# Patient Record
Sex: FEMALE | Race: WHITE | NOT HISPANIC OR LATINO | Employment: FULL TIME | URBAN - METROPOLITAN AREA
[De-identification: names, ages, dates, MRNs, and addresses within clinical notes are randomized per-mention and may not be internally consistent; named-entity substitution may affect disease eponyms.]

---

## 2017-02-15 ENCOUNTER — ALLSCRIPTS OFFICE VISIT (OUTPATIENT)
Dept: OTHER | Facility: OTHER | Age: 22
End: 2017-02-15

## 2017-03-20 ENCOUNTER — GENERIC CONVERSION - ENCOUNTER (OUTPATIENT)
Dept: OTHER | Facility: OTHER | Age: 22
End: 2017-03-20

## 2018-01-14 VITALS
SYSTOLIC BLOOD PRESSURE: 140 MMHG | BODY MASS INDEX: 27.11 KG/M2 | HEIGHT: 64 IN | DIASTOLIC BLOOD PRESSURE: 92 MMHG | TEMPERATURE: 99.4 F | HEART RATE: 104 BPM | WEIGHT: 158.8 LBS

## 2018-04-11 ENCOUNTER — OFFICE VISIT (OUTPATIENT)
Dept: FAMILY MEDICINE CLINIC | Facility: HOSPITAL | Age: 23
End: 2018-04-11
Payer: COMMERCIAL

## 2018-04-11 VITALS
HEIGHT: 64 IN | TEMPERATURE: 98.4 F | BODY MASS INDEX: 27.42 KG/M2 | WEIGHT: 160.6 LBS | SYSTOLIC BLOOD PRESSURE: 128 MMHG | RESPIRATION RATE: 12 BRPM | HEART RATE: 76 BPM | DIASTOLIC BLOOD PRESSURE: 82 MMHG

## 2018-04-11 DIAGNOSIS — J35.8 TONSILLITH: Primary | ICD-10-CM

## 2018-04-11 PROCEDURE — 99213 OFFICE O/P EST LOW 20 MIN: CPT | Performed by: FAMILY MEDICINE

## 2018-04-11 RX ORDER — LORATADINE 10 MG/1
TABLET ORAL
COMMUNITY
End: 2018-12-05

## 2018-04-11 RX ORDER — NORETHINDRONE ACETATE AND ETHINYL ESTRADIOL AND FERROUS FUMARATE 1MG-20(21)
1 KIT ORAL DAILY
Refills: 2 | COMMUNITY
Start: 2018-03-14 | End: 2022-03-28

## 2018-04-11 RX ORDER — FLUTICASONE PROPIONATE 50 MCG
SPRAY, SUSPENSION (ML) NASAL
COMMUNITY
End: 2018-12-05

## 2018-04-11 NOTE — PROGRESS NOTES
[facility]  [address] [address]  Tel: [phone]    Patient is here for an acute visit    With hx of tonsiliths  Gets stones in her tonsillar area  Food also gets caught  This may lead to sore throats  Notes bad breath can be associated with this  No fever, no current sore throat  Would like this to be treated  -----------------------------  Review of Systems   Constitutional: Negative  Negative for activity change, appetite change, chills, diaphoresis, fatigue and fever  HENT: Negative for congestion, facial swelling and sore throat  Respiratory: Negative  Negative for apnea, cough, chest tightness and shortness of breath  Cardiovascular: Negative  Negative for chest pain and palpitations  Gastrointestinal: Negative  Negative for abdominal distention, abdominal pain, blood in stool, constipation, diarrhea and nausea  Genitourinary: Negative  Negative for difficulty urinating, dysuria, flank pain and frequency  Social Hx reviewed:    Social History     Social History    Marital status: /Civil Union     Spouse name: N/A    Number of children: N/A    Years of education: N/A     Occupational History    Not on file  Social History Main Topics    Smoking status: Never Smoker    Smokeless tobacco: Never Used    Alcohol use Yes      Comment: social    Drug use: No    Sexual activity: Not on file      Comment: Oral contraceptive use      Other Topics Concern    Not on file     Social History Narrative    No narrative on file       Past Medical History:   Diagnosis Date    GERD (gastroesophageal reflux disease)     [place]   10/12/15     Lactose intolerance     [place]   12/8/16           No Known Allergies      Vitals:    04/11/18 1719   BP: 128/82   Pulse: 76   Resp: 12   Temp: 98 4 °F (36 9 °C)     Physical Exam   Constitutional: She appears well-developed and well-nourished  HENT:   Head: Normocephalic and atraumatic  Right Ear: Tympanic membrane, external ear and ear canal normal    Left Ear: Tympanic membrane, external ear and ear canal normal    Nose: No mucosal edema or rhinorrhea  Right sinus exhibits no maxillary sinus tenderness and no frontal sinus tenderness  Left sinus exhibits no maxillary sinus tenderness and no frontal sinus tenderness  Mouth/Throat: Uvula is midline and oropharynx is clear and moist  Tonsils are 2+ on the right  Tonsils are 2+ on the left  Eyes: Conjunctivae, EOM and lids are normal  Pupils are equal, round, and reactive to light  Right eye exhibits no discharge and no exudate  Left eye exhibits no discharge and no exudate  Right conjunctiva is not injected  Right conjunctiva has no hemorrhage  Left conjunctiva is not injected  Left conjunctiva has no hemorrhage  Neck: Normal range of motion  Neck supple  Cardiovascular: Normal rate, regular rhythm and normal heart sounds  Pulmonary/Chest: Effort normal and breath sounds normal    Abdominal: Soft  Bowel sounds are normal    Skin: Skin is warm and dry  Psychiatric: She has a normal mood and affect  Nursing note and vitals reviewed  Problem List Items Addressed This Visit     None      Visit Diagnoses     Tonsillith    -  Primary    Relevant Orders    Ambulatory Referral to Otolaryngology        Discussed tonsilith/frequent sore throats  Advised referral to ENT for consideration of tonsillectomy  To call our office if any concerns/questions at [phone]

## 2018-05-21 ENCOUNTER — TELEPHONE (OUTPATIENT)
Dept: FAMILY MEDICINE CLINIC | Facility: HOSPITAL | Age: 23
End: 2018-05-21

## 2018-05-21 NOTE — TELEPHONE ENCOUNTER
appt with Massimo carter, per documentation from Urgent Care she is to see a Urologist  Pt has appt with Urologist on 6/5/18, could not get in sooner   Pt was instructed to call our office if she has any active symptoms prior to Urologist appt, she is not currently having any now

## 2018-06-04 NOTE — PROGRESS NOTES
6/5/2018      Chief Complaint   Patient presents with    Urinary Tract Infection    Gross Hematuria       Assessment and Plan    21 y o  female managed by Dr Wilson Cassette    1  Gross hematuria   -will proceed with cystoscopy and ultrasound of the patient's kidney and bladders given her gross hematuria and smoking history, the patient understands and agrees to this treatment plan, will call with any further gross hematuria in the meantime      History of Present Illness  Jean Szymanski is a 21 y o  female here for follow up evaluation of gross hematuria  States that this occurred on May 19th and her menstrual cycle had ended on May 17th  She denies any menstrual abnormalities and/or spotting on a regular basis  Presented to the urgent care and had a straight catheterized urine specimen collected which revealed 3+ red blood cells  Culture revealed no growth  KUB revealed no stones  The patient denies any gross hematuria since this time  Urine dip in office today reveals blood  Does have a smoking history  No family history of  abnormalities and/or cancer  Review of Systems   Constitutional: Negative for activity change, chills and fever  Gastrointestinal: Negative for abdominal distention and abdominal pain  Musculoskeletal: Negative for back pain and gait problem  Psychiatric/Behavioral: Negative for behavioral problems and confusion  Urinary Incontinence Screening      Most Recent Value   Urinary Incontinence   Urinary Incontinence? No   Incomplete emptying? Yes   Urinary frequency? No   Urinary urgency? No   Urinary hesitancy? Yes   Dysuria (painful difficult urination)? No   Nocturia (waking up to use the bathroom)? No   Straining (having to push to go)? No   Weak stream?  No   Intermittent stream?  No   Vaginal pressure? Yes   Vaginal dryness? No          Past Medical History  Past Medical History:   Diagnosis Date    GERD (gastroesophageal reflux disease)     LA   10/12/15  Lactose intolerance     [place]   12/8/16       Past Social History  Past Surgical History:   Procedure Laterality Date    TOOTH EXTRACTION  2012     History   Smoking Status    Never Smoker   Smokeless Tobacco    Never Used       Past Family History  Family History   Problem Relation Age of Onset    Diabetes Father     Breast cancer Paternal Grandmother     Diabetes Paternal Grandfather        Past Social history  Social History     Social History    Marital status: /Civil Union     Spouse name: N/A    Number of children: N/A    Years of education: N/A     Occupational History    Not on file  Social History Main Topics    Smoking status: Never Smoker    Smokeless tobacco: Never Used    Alcohol use Yes      Comment: social    Drug use: No    Sexual activity: Not on file      Comment: Oral contraceptive use      Other Topics Concern    Not on file     Social History Narrative    No narrative on file       Current Medications  Current Outpatient Prescriptions   Medication Sig Dispense Refill    fluticasone (FLONASE ALLERGY RELIEF) 50 mcg/act nasal spray into each nostril      JUNEL FE 1/20 1-20 MG-MCG per tablet Take 1 tablet by mouth daily  2    Lactase 4500 units CHEW Chew      loratadine (CLARITIN) 10 mg tablet Take by mouth       No current facility-administered medications for this visit  Allergies  No Known Allergies      The following portions of the patient's history were reviewed and updated as appropriate: allergies, current medications, past medical history, past social history, past surgical history and problem list       Vitals  Vitals:    06/05/18 1013   BP: 110/80   Pulse: 76   Weight: 71 4 kg (157 lb 8 oz)   Height: 5' 4" (1 626 m)       Physical Exam  Constitutional   General appearance: Patient is seated and in no acute distress, well appearing and well nourished     Head and Face   Head and face: Normal     Eyes   Conjunctiva and lids: No erythema, swelling or discharge  Ears, Nose, Mouth, and Throat   Hearing: Normal     Pulmonary   Respiratory effort: No increased work of breathing or signs of respiratory distress  Cardiovascular   Examination of extremities for edema and/or varicosities: Normal     Abdomen   Abdomen: Non-tender, no masses  Musculoskeletal   Gait and station: Normal     Skin   Skin and subcutaneous tissue: Warm, dry, and intact  No visible lesions or rashes    Psychiatric   Judgment and insight: Normal  Recent and remote memory:  Normal  Mood and affect: Normal      Results  Recent Results (from the past 1 hour(s))   POCT urine dip    Collection Time: 06/05/18 10:24 AM   Result Value Ref Range    LEUKOCYTE ESTERASE,UA trace      NITRITE,UA -     SL AMB POCT UROBILINOGEN -     SL AMB POCT URINE PROTEIN -      PH,UA 6 0      BLOOD,UA moderate      SPECIFIC GRAVITY,UA 1 005      KETONES,UA -      BILIRUBIN,UA -     GLUCOSE, UA -      COLOR,UA yellow      CLARITY,UA clear    ]  No results found for: PSA  Lab Results   Component Value Date    GLUCOSE 96 12/07/2016     Lab Results   Component Value Date    WBC 10 39 (H) 12/07/2016    HGB 15 3 12/07/2016    HCT 43 7 12/07/2016    MCV 85 12/07/2016     12/07/2016       Orders  Orders Placed This Encounter   Procedures    POCT urine dip

## 2018-06-05 ENCOUNTER — OFFICE VISIT (OUTPATIENT)
Dept: UROLOGY | Facility: HOSPITAL | Age: 23
End: 2018-06-05
Payer: COMMERCIAL

## 2018-06-05 VITALS
WEIGHT: 157.5 LBS | SYSTOLIC BLOOD PRESSURE: 110 MMHG | HEART RATE: 76 BPM | DIASTOLIC BLOOD PRESSURE: 80 MMHG | BODY MASS INDEX: 26.89 KG/M2 | HEIGHT: 64 IN

## 2018-06-05 DIAGNOSIS — R31.0 GROSS HEMATURIA: Primary | ICD-10-CM

## 2018-06-05 DIAGNOSIS — Z87.448 HISTORY OF GROSS HEMATURIA: Primary | ICD-10-CM

## 2018-06-05 LAB
BACTERIA UR QL AUTO: NORMAL /HPF
BILIRUB UR QL STRIP: NEGATIVE
CLARITY UR: CLEAR
COLOR UR: YELLOW
GLUCOSE UR STRIP-MCNC: NEGATIVE MG/DL
HGB UR QL STRIP.AUTO: NEGATIVE
HYALINE CASTS #/AREA URNS LPF: NORMAL /LPF
KETONES UR STRIP-MCNC: NEGATIVE MG/DL
LEUKOCYTE ESTERASE UR QL STRIP: NEGATIVE
NITRITE UR QL STRIP: NEGATIVE
NON-SQ EPI CELLS URNS QL MICRO: NORMAL /HPF
PH UR STRIP.AUTO: 6.5 [PH] (ref 4.5–8)
PROT UR STRIP-MCNC: NEGATIVE MG/DL
RBC #/AREA URNS AUTO: NORMAL /HPF
SL AMB  POCT GLUCOSE, UA: NORMAL
SL AMB LEUKOCYTE ESTERASE,UA: NORMAL
SL AMB POCT BILIRUBIN,UA: NORMAL
SL AMB POCT BLOOD,UA: NORMAL
SL AMB POCT CLARITY,UA: CLEAR
SL AMB POCT COLOR,UA: YELLOW
SL AMB POCT KETONES,UA: NORMAL
SL AMB POCT NITRITE,UA: NORMAL
SL AMB POCT PH,UA: 6
SL AMB POCT SPECIFIC GRAVITY,UA: 1
SL AMB POCT URINE PROTEIN: NORMAL
SL AMB POCT UROBILINOGEN: NORMAL
SP GR UR STRIP.AUTO: 1.01 (ref 1–1.03)
UROBILINOGEN UR QL STRIP.AUTO: 0.2 E.U./DL
WBC #/AREA URNS AUTO: NORMAL /HPF

## 2018-06-05 PROCEDURE — 87086 URINE CULTURE/COLONY COUNT: CPT | Performed by: PHYSICIAN ASSISTANT

## 2018-06-05 PROCEDURE — 81002 URINALYSIS NONAUTO W/O SCOPE: CPT | Performed by: PHYSICIAN ASSISTANT

## 2018-06-05 PROCEDURE — 99204 OFFICE O/P NEW MOD 45 MIN: CPT | Performed by: PHYSICIAN ASSISTANT

## 2018-06-05 PROCEDURE — 81001 URINALYSIS AUTO W/SCOPE: CPT | Performed by: PHYSICIAN ASSISTANT

## 2018-06-06 LAB — BACTERIA UR CULT: ABNORMAL

## 2018-06-11 ENCOUNTER — HOSPITAL ENCOUNTER (OUTPATIENT)
Dept: ULTRASOUND IMAGING | Facility: HOSPITAL | Age: 23
Discharge: HOME | End: 2018-06-11
Payer: COMMERCIAL

## 2018-06-11 DIAGNOSIS — R31.0 GROSS HEMATURIA: ICD-10-CM

## 2018-06-11 PROCEDURE — 76770 US EXAM ABDO BACK WALL COMP: CPT

## 2018-06-19 ENCOUNTER — TELEPHONE (OUTPATIENT)
Dept: UROLOGY | Facility: AMBULATORY SURGERY CENTER | Age: 23
End: 2018-06-19

## 2018-07-27 ENCOUNTER — PROCEDURE VISIT (OUTPATIENT)
Dept: UROLOGY | Facility: HOSPITAL | Age: 23
End: 2018-07-27
Payer: COMMERCIAL

## 2018-07-27 VITALS
HEIGHT: 64 IN | BODY MASS INDEX: 25.1 KG/M2 | WEIGHT: 147 LBS | DIASTOLIC BLOOD PRESSURE: 64 MMHG | SYSTOLIC BLOOD PRESSURE: 110 MMHG

## 2018-07-27 DIAGNOSIS — R31.0 GROSS HEMATURIA: Primary | ICD-10-CM

## 2018-07-27 PROCEDURE — 88112 CYTOPATH CELL ENHANCE TECH: CPT | Performed by: PATHOLOGY

## 2018-07-27 PROCEDURE — 52000 CYSTOURETHROSCOPY: CPT | Performed by: UROLOGY

## 2018-07-27 NOTE — PROGRESS NOTES
Written Consent Obtained  Indications for Procedure:  Gross hematuria    Physical Exam    Constitutional   General appearance: No acute distress, well appearing and well nourished  Pulmonary   Respiratory effort: No increased work of breathing or signs of respiratory distress  Cardiovascular   Examination of extremities for edema and/or varicosities: Normal     Abdomen   Abdomen: Non-tender, no masses  Liver and spleen: No hepatomegaly or splenomegaly  Genitourinary   External genitalia and vagina: Normal, no lesions appreciated  Urethra: Normal, no discharge  Bladder: Not distended, no tenderness  Musculoskeletal   Gait and station: Normal     Skin   Skin and subcutaneous tissue: Normal without rashes or lesions  Lymphatic   Palpation of lymph nodes in groin: No lymphadenopathy  Additional Exam: Neuro exam nonfocal           The flexible cystoscope was introduced into the urethra and advanced into the bladder  URETHRA:  Normal,  without strictures or lesions  TRIGONE & UOs:   Normal anatomy with efflux of clear urine  No mucosal lesions or subtrigonal masses  BLADDER MUCOSA:  Normal, without neoplasms or other lesions  DETRUSOR: Normal capacity, without flaccidity, without excessive compliance, without trabeculation or diverticula, without uninhibited bladder contractions on fillings  RETROFLEXED SCOPE VIEW: Normal bladder neck    Plan:Imaging and cystoscopy are negative  We will send the urine for cytology  If this is negative it will complete the hematuria workup  She will follow up with us on an as-needed basis  She is to contact us if she has any further episodes of gross hematuria

## 2018-12-05 ENCOUNTER — OFFICE VISIT (OUTPATIENT)
Dept: FAMILY MEDICINE CLINIC | Facility: HOSPITAL | Age: 23
End: 2018-12-05
Payer: COMMERCIAL

## 2018-12-05 VITALS
HEIGHT: 65 IN | SYSTOLIC BLOOD PRESSURE: 122 MMHG | BODY MASS INDEX: 26.56 KG/M2 | HEART RATE: 86 BPM | TEMPERATURE: 97.5 F | DIASTOLIC BLOOD PRESSURE: 82 MMHG | WEIGHT: 159.4 LBS

## 2018-12-05 DIAGNOSIS — J02.9 PHARYNGITIS, UNSPECIFIED ETIOLOGY: Primary | ICD-10-CM

## 2018-12-05 LAB — S PYO AG THROAT QL: NEGATIVE

## 2018-12-05 PROCEDURE — 87880 STREP A ASSAY W/OPTIC: CPT | Performed by: FAMILY MEDICINE

## 2018-12-05 PROCEDURE — 3008F BODY MASS INDEX DOCD: CPT | Performed by: FAMILY MEDICINE

## 2018-12-05 PROCEDURE — 99213 OFFICE O/P EST LOW 20 MIN: CPT | Performed by: FAMILY MEDICINE

## 2018-12-05 NOTE — PROGRESS NOTES
[facility]  [address] [address]  Tel: [phone]      Assessment/Plan:    Problem List Items Addressed This Visit     None      Visit Diagnoses     Pharyngitis, unspecified etiology    -  Primary        Consistent with viral illness  Rapid strep negative  Continue with supportive treatment         _____________________________________________________________________    History of Present Illness:     Patient is here for an acute visit      Sore Throat    Woke up this morning with sore throat  Gland swollen  Works in a salon with a lot of people  No known contact  No fever, no chills  No sinus pressure or pain  No pnd  No ear pain, no coughing  Sore Throat    This is a new problem  The current episode started today  The problem has been unchanged  Neither side of throat is experiencing more pain than the other  There has been no fever  Pertinent negatives include no abdominal pain, congestion, coughing, diarrhea, drooling, ear discharge, ear pain, headaches, hoarse voice, plugged ear sensation or neck pain  She has had no exposure to strep or mono  She has tried nothing for the symptoms  -----------------------------  Review of Systems   HENT: Positive for sore throat  Negative for congestion, drooling, ear discharge, ear pain and hoarse voice  Respiratory: Negative for cough  Gastrointestinal: Negative for abdominal pain and diarrhea  Musculoskeletal: Negative for neck pain  Neurological: Negative for headaches  Social Hx reviewed:    Social History     Social History    Marital status: /Civil Union     Spouse name: N/A    Number of children: N/A    Years of education: N/A     Occupational History    Not on file       Social History Main Topics    Smoking status: Never Smoker    Smokeless tobacco: Never Used    Alcohol use Yes      Comment: social    Drug use: No    Sexual activity: Not on file      Comment: Oral contraceptive use      Other Topics Concern    Not on file     Social History Narrative    No narrative on file       Past Medical History:   Diagnosis Date    GERD (gastroesophageal reflux disease)     LA   10/12/15     Lactose intolerance     LA   12/8/16           No Known Allergies      Vitals:    12/05/18 0827   BP: 122/82   Pulse: 86   Temp: 97 5 °F (36 4 °C)     Physical Exam   Constitutional: She appears well-developed and well-nourished  HENT:   Head: Normocephalic and atraumatic  Right Ear: Tympanic membrane, external ear and ear canal normal    Left Ear: Tympanic membrane, external ear and ear canal normal    Nose: No mucosal edema or rhinorrhea  Right sinus exhibits no maxillary sinus tenderness and no frontal sinus tenderness  Left sinus exhibits no maxillary sinus tenderness and no frontal sinus tenderness  Mouth/Throat: Uvula is midline and mucous membranes are normal  Posterior oropharyngeal erythema present  No oropharyngeal exudate, posterior oropharyngeal edema or tonsillar abscesses  No tonsillar exudate  Eyes: Pupils are equal, round, and reactive to light  Conjunctivae, EOM and lids are normal  Right eye exhibits no discharge and no exudate  Left eye exhibits no discharge and no exudate  Right conjunctiva is not injected  Right conjunctiva has no hemorrhage  Left conjunctiva is not injected  Left conjunctiva has no hemorrhage  Neck: Normal range of motion  Neck supple  Cardiovascular: Normal rate, regular rhythm and normal heart sounds  Pulmonary/Chest: Effort normal and breath sounds normal    Abdominal: Soft  Bowel sounds are normal    Skin: Skin is warm and dry  Psychiatric: She has a normal mood and affect  Nursing note and vitals reviewed  To call our office if any concerns/questions at [phone]

## 2018-12-06 ENCOUNTER — TELEPHONE (OUTPATIENT)
Dept: FAMILY MEDICINE CLINIC | Facility: HOSPITAL | Age: 23
End: 2018-12-06

## 2018-12-06 NOTE — TELEPHONE ENCOUNTER
Pt reports body aches, severe sore throat, fever, and diarrhea  Was seen yesterday   Can we send something in?

## 2018-12-06 NOTE — TELEPHONE ENCOUNTER
Please call  That is consistent with viral illness and recommend to continue with increase fluids, rest, tylenol for fever > 101F  To call Monday/tuesday if not better

## 2019-01-18 ENCOUNTER — TELEPHONE (OUTPATIENT)
Dept: FAMILY MEDICINE CLINIC | Facility: HOSPITAL | Age: 24
End: 2019-01-18

## 2019-01-18 NOTE — TELEPHONE ENCOUNTER
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

## 2019-01-18 NOTE — TELEPHONE ENCOUNTER
Rash could be from the Augmentin - stop the abx if she has not already done so - if rash persists she has to be seen

## 2019-12-16 ENCOUNTER — TELEPHONE (OUTPATIENT)
Dept: FAMILY MEDICINE CLINIC | Facility: HOSPITAL | Age: 24
End: 2019-12-16

## 2020-01-21 ENCOUNTER — HOSPITAL ENCOUNTER (EMERGENCY)
Facility: HOSPITAL | Age: 25
Discharge: HOME | End: 2020-01-21
Attending: EMERGENCY MEDICINE | Admitting: EMERGENCY MEDICINE
Payer: COMMERCIAL

## 2020-01-21 ENCOUNTER — APPOINTMENT (EMERGENCY)
Dept: CT IMAGING | Facility: HOSPITAL | Age: 25
End: 2020-01-21
Payer: COMMERCIAL

## 2020-01-21 VITALS
TEMPERATURE: 97.9 F | OXYGEN SATURATION: 100 % | BODY MASS INDEX: 28.12 KG/M2 | SYSTOLIC BLOOD PRESSURE: 137 MMHG | WEIGHT: 164.68 LBS | HEART RATE: 99 BPM | HEIGHT: 64 IN | DIASTOLIC BLOOD PRESSURE: 73 MMHG | RESPIRATION RATE: 18 BRPM

## 2020-01-21 DIAGNOSIS — N23 RENAL COLIC ON LEFT SIDE: ICD-10-CM

## 2020-01-21 DIAGNOSIS — R10.9 LEFT FLANK PAIN: Primary | ICD-10-CM

## 2020-01-21 LAB
ALBUMIN SERPL BCP-MCNC: 3.8 G/DL (ref 3.5–5)
ALP SERPL-CCNC: 50 U/L (ref 46–116)
ALT SERPL W P-5'-P-CCNC: 26 U/L (ref 12–78)
ANION GAP SERPL CALCULATED.3IONS-SCNC: 9 MMOL/L (ref 4–13)
AST SERPL W P-5'-P-CCNC: 16 U/L (ref 5–45)
BACTERIA UR QL AUTO: ABNORMAL /HPF
BASOPHILS # BLD AUTO: 0.04 THOUSANDS/ΜL (ref 0–0.1)
BASOPHILS NFR BLD AUTO: 1 % (ref 0–1)
BILIRUB SERPL-MCNC: 0.3 MG/DL (ref 0.2–1)
BILIRUB UR QL STRIP: NEGATIVE
BUN SERPL-MCNC: 12 MG/DL (ref 5–25)
CALCIUM SERPL-MCNC: 9.4 MG/DL (ref 8.3–10.1)
CHLORIDE SERPL-SCNC: 107 MMOL/L (ref 100–108)
CLARITY UR: ABNORMAL
CLARITY, POC: CLEAR
CO2 SERPL-SCNC: 28 MMOL/L (ref 21–32)
COLOR UR: YELLOW
COLOR, POC: YELLOW
CREAT SERPL-MCNC: 0.85 MG/DL (ref 0.6–1.3)
EOSINOPHIL # BLD AUTO: 0.19 THOUSAND/ΜL (ref 0–0.61)
EOSINOPHIL NFR BLD AUTO: 2 % (ref 0–6)
ERYTHROCYTE [DISTWIDTH] IN BLOOD BY AUTOMATED COUNT: 12 % (ref 11.6–15.1)
EXT BILIRUBIN, UA: ABNORMAL
EXT BLOOD URINE: ABNORMAL
EXT GLUCOSE, UA: ABNORMAL
EXT KETONES: ABNORMAL
EXT NITRITE, UA: ABNORMAL
EXT PH, UA: 6
EXT PREG TEST URINE: NEGATIVE
EXT PROTEIN, UA: ABNORMAL
EXT SPECIFIC GRAVITY, UA: 1.02
EXT UROBILINOGEN: 0.2
EXT. CONTROL ED NAV: NORMAL
GFR SERPL CREATININE-BSD FRML MDRD: 96 ML/MIN/1.73SQ M
GLUCOSE SERPL-MCNC: 81 MG/DL (ref 65–140)
GLUCOSE UR STRIP-MCNC: NEGATIVE MG/DL
HCT VFR BLD AUTO: 43 % (ref 34.8–46.1)
HGB BLD-MCNC: 14.9 G/DL (ref 11.5–15.4)
HGB UR QL STRIP.AUTO: ABNORMAL
IMM GRANULOCYTES # BLD AUTO: 0.02 THOUSAND/UL (ref 0–0.2)
IMM GRANULOCYTES NFR BLD AUTO: 0 % (ref 0–2)
KETONES UR STRIP-MCNC: NEGATIVE MG/DL
LEUKOCYTE ESTERASE UR QL STRIP: ABNORMAL
LIPASE SERPL-CCNC: 122 U/L (ref 73–393)
LYMPHOCYTES # BLD AUTO: 3.04 THOUSANDS/ΜL (ref 0.6–4.47)
LYMPHOCYTES NFR BLD AUTO: 39 % (ref 14–44)
MCH RBC QN AUTO: 30.5 PG (ref 26.8–34.3)
MCHC RBC AUTO-ENTMCNC: 34.7 G/DL (ref 31.4–37.4)
MCV RBC AUTO: 88 FL (ref 82–98)
MONOCYTES # BLD AUTO: 0.56 THOUSAND/ΜL (ref 0.17–1.22)
MONOCYTES NFR BLD AUTO: 7 % (ref 4–12)
MUCOUS THREADS UR QL AUTO: ABNORMAL
NEUTROPHILS # BLD AUTO: 3.93 THOUSANDS/ΜL (ref 1.85–7.62)
NEUTS SEG NFR BLD AUTO: 51 % (ref 43–75)
NITRITE UR QL STRIP: NEGATIVE
NON-SQ EPI CELLS URNS QL MICRO: ABNORMAL /HPF
NRBC BLD AUTO-RTO: 0 /100 WBCS
PH UR STRIP.AUTO: 6 [PH]
PLATELET # BLD AUTO: 225 THOUSANDS/UL (ref 149–390)
PMV BLD AUTO: 11 FL (ref 8.9–12.7)
POTASSIUM SERPL-SCNC: 3.4 MMOL/L (ref 3.5–5.3)
PROT SERPL-MCNC: 7.4 G/DL (ref 6.4–8.2)
PROT UR STRIP-MCNC: NEGATIVE MG/DL
RBC # BLD AUTO: 4.89 MILLION/UL (ref 3.81–5.12)
RBC #/AREA URNS AUTO: ABNORMAL /HPF
SODIUM SERPL-SCNC: 144 MMOL/L (ref 136–145)
SP GR UR STRIP.AUTO: 1.02 (ref 1–1.03)
UROBILINOGEN UR QL STRIP.AUTO: 0.2 E.U./DL
WBC # BLD AUTO: 7.78 THOUSAND/UL (ref 4.31–10.16)
WBC # BLD EST: ABNORMAL 10*3/UL
WBC #/AREA URNS AUTO: ABNORMAL /HPF

## 2020-01-21 PROCEDURE — 99284 EMERGENCY DEPT VISIT MOD MDM: CPT

## 2020-01-21 PROCEDURE — 83690 ASSAY OF LIPASE: CPT

## 2020-01-21 PROCEDURE — 80053 COMPREHEN METABOLIC PANEL: CPT

## 2020-01-21 PROCEDURE — 96374 THER/PROPH/DIAG INJ IV PUSH: CPT

## 2020-01-21 PROCEDURE — 85025 COMPLETE CBC W/AUTO DIFF WBC: CPT

## 2020-01-21 PROCEDURE — 36415 COLL VENOUS BLD VENIPUNCTURE: CPT

## 2020-01-21 PROCEDURE — 99284 EMERGENCY DEPT VISIT MOD MDM: CPT | Performed by: PHYSICIAN ASSISTANT

## 2020-01-21 PROCEDURE — 74176 CT ABD & PELVIS W/O CONTRAST: CPT

## 2020-01-21 PROCEDURE — 96361 HYDRATE IV INFUSION ADD-ON: CPT

## 2020-01-21 PROCEDURE — 81025 URINE PREGNANCY TEST: CPT

## 2020-01-21 PROCEDURE — 81001 URINALYSIS AUTO W/SCOPE: CPT | Performed by: PHYSICIAN ASSISTANT

## 2020-01-21 RX ORDER — KETOROLAC TROMETHAMINE 30 MG/ML
15 INJECTION, SOLUTION INTRAMUSCULAR; INTRAVENOUS ONCE
Status: COMPLETED | OUTPATIENT
Start: 2020-01-21 | End: 2020-01-21

## 2020-01-21 RX ORDER — TRAMADOL HYDROCHLORIDE 50 MG/1
50 TABLET ORAL EVERY 6 HOURS PRN
Qty: 10 TABLET | Refills: 0 | Status: SHIPPED | OUTPATIENT
Start: 2020-01-21 | End: 2020-01-27

## 2020-01-21 RX ORDER — MULTIVIT-MIN/IRON FUM/FOLIC AC 7.5 MG-4
1 TABLET ORAL DAILY
COMMUNITY

## 2020-01-21 RX ADMIN — KETOROLAC TROMETHAMINE 15 MG: 30 INJECTION, SOLUTION INTRAMUSCULAR; INTRAVENOUS at 13:51

## 2020-01-21 RX ADMIN — SODIUM CHLORIDE 1000 ML: 0.9 INJECTION, SOLUTION INTRAVENOUS at 13:49

## 2020-01-21 NOTE — DISCHARGE INSTRUCTIONS
Rest, increase fluids  Strain your urine  Follow up with urologist if pain continues  Return to ER if fevers, worsening pain, vomiting

## 2020-01-21 NOTE — ED PROVIDER NOTES
History  Chief Complaint   Patient presents with    Abdominal Pain     patient reports LLQ pain that radiates to back x1 hour  states that she had to have a BM but she went to the bathroom and now is worse  Nothing relieves or exacerbates pain  rates 8/10  Denies n/v/d, fever, urinary symptoms     Patient is a 21 y/o F that presents to the ED with left flank pain that started about 1 hour ago  She states she went to the bathroom and the pain started suddenly  The pain is constant and she rates it an 8/10 on the pain scale  She states the pain is radiating to her back  No nausea, vomiting or diarrhea  No blood in her stool  No fevers/chills  No urinary symptoms  She states she never had pain like this before  Nothing makes the pain worse, nothing makes it better  She did not take anything for the pain  History provided by:  Patient  Abdominal Pain   Pain location:  L flank  Pain quality: sharp and stabbing    Pain radiates to:  L flank  Pain severity:  Moderate  Onset quality:  Sudden  Duration:  1 hour  Timing:  Constant  Progression:  Worsening  Chronicity:  New  Context: not sick contacts, not suspicious food intake and not trauma    Relieved by:  Nothing  Worsened by:  Nothing  Ineffective treatments:  None tried  Associated symptoms: no chest pain, no chills, no constipation, no cough, no diarrhea, no dysuria, no fever, no hematuria, no nausea, no shortness of breath and no vomiting    Risk factors: not obese, not pregnant and no recent hospitalization        Prior to Admission Medications   Prescriptions Last Dose Informant Patient Reported? Taking? JUNEL FE 1/20 1-20 MG-MCG per tablet  Self Yes No   Sig: Take 1 tablet by mouth daily   Multiple Vitamins-Minerals (MULTIVITAMIN WITH MINERALS) tablet   Yes Yes   Sig: Take 1 tablet by mouth daily      Facility-Administered Medications: None       Past Medical History:   Diagnosis Date    GERD (gastroesophageal reflux disease)     [place]   10/12/15  Lactose intolerance     [place]   12/8/16       Past Surgical History:   Procedure Laterality Date    TONSILLECTOMY      TOOTH EXTRACTION  2012       Family History   Problem Relation Age of Onset    Diabetes Father     Breast cancer Paternal Grandmother     Diabetes Paternal Grandfather      I have reviewed and agree with the history as documented  Social History     Tobacco Use    Smoking status: Former Smoker    Smokeless tobacco: Never Used   Substance Use Topics    Alcohol use: Yes     Comment: social    Drug use: No        Review of Systems   Constitutional: Negative for chills and fever  HENT: Negative  Respiratory: Negative for cough and shortness of breath  Cardiovascular: Negative for chest pain and leg swelling  Gastrointestinal: Positive for abdominal pain  Negative for blood in stool, constipation, diarrhea, nausea and vomiting  Genitourinary: Positive for flank pain  Negative for dysuria, hematuria and urgency  Musculoskeletal: Negative for back pain and neck pain  Skin: Negative for color change and rash  Neurological: Negative for dizziness, weakness and numbness  Psychiatric/Behavioral: Negative for confusion  All other systems reviewed and are negative  Physical Exam  Physical Exam   Constitutional: She is oriented to person, place, and time  She appears well-developed and well-nourished  She is cooperative  She does not appear ill  She appears distressed  HENT:   Head: Normocephalic and atraumatic  Right Ear: Hearing normal    Left Ear: Hearing normal    Nose: Nose normal    Mouth/Throat: Oropharynx is clear and moist    Eyes: Conjunctivae are normal    Neck: Normal range of motion  Cardiovascular: Normal rate, regular rhythm and normal heart sounds  No murmur heard  Pulmonary/Chest: Effort normal and breath sounds normal  She has no wheezes  She has no rhonchi  She has no rales  Abdominal: Soft   Normal appearance and bowel sounds are normal  There is no tenderness  There is no rigidity, no rebound, no guarding and no CVA tenderness  Musculoskeletal: Normal range of motion  She exhibits no edema  Neurological: She is alert and oriented to person, place, and time  She has normal strength  No sensory deficit  Gait normal    Skin: Skin is warm and dry  No rash noted  She is not diaphoretic  No pallor  Nursing note and vitals reviewed        Vital Signs  ED Triage Vitals [01/21/20 1325]   Temperature Pulse Respirations Blood Pressure SpO2   97 9 °F (36 6 °C) 99 18 137/73 100 %      Temp Source Heart Rate Source Patient Position - Orthostatic VS BP Location FiO2 (%)   Temporal Monitor Sitting Right arm --      Pain Score       8           Vitals:    01/21/20 1325   BP: 137/73   Pulse: 99   Patient Position - Orthostatic VS: Sitting         Visual Acuity      ED Medications  Medications   sodium chloride 0 9 % bolus 1,000 mL (0 mL Intravenous Stopped 1/21/20 1558)   ketorolac (TORADOL) injection 15 mg (15 mg Intravenous Given 1/21/20 1351)       Diagnostic Studies  Results Reviewed     Procedure Component Value Units Date/Time    Urine Microscopic [[id number]]  (Abnormal) Collected:  01/21/20 1456    Lab Status:  Final result Specimen:  Urine, Clean Catch Updated:  01/21/20 1532     RBC, UA None Seen /hpf      WBC, UA None Seen /hpf      Epithelial Cells Moderate /hpf      Bacteria, UA Occasional /hpf      MUCUS THREADS Occasional    UA w Reflex to Microscopic w Reflex to Culture [[id number]]  (Abnormal) Collected:  01/21/20 1456    Lab Status:  Final result Specimen:  Urine, Clean Catch Updated:  01/21/20 1502     Color, UA Yellow     Clarity, UA Cloudy     Specific Gravity, UA 1 025     pH, UA 6 0     Leukocytes, UA Small     Nitrite, UA Negative     Protein, UA Negative mg/dl      Glucose, UA Negative mg/dl      Ketones, UA Negative mg/dl      Urobilinogen, UA 0 2 E U /dl      Bilirubin, UA Negative     Blood, UA Small    POCT urinalysis dipstick [[id number]] (Abnormal) Resulted:  01/21/20 1442    Lab Status:  Final result Specimen:  Urine Updated:  01/21/20 1444     Color, UA YELLOW     Clarity, UA CLEAR     Glucose, UA (Ref: Negative) NEG     Bilirubin, UA (Ref: Negative) NEG     Ketones, UA (Ref: Negative) NEG     Spec Grav, UA (Ref:1 003-1 030) 1 025     Blood, UA (Ref: Negative) MODERATE     pH, UA (Ref: 4 5-8 0) 6 0     Protein, UA (Ref: Negative) NEG     Urobilinogen, UA (Ref: 0 2- 1 0) 0 2      Leukocytes, UA (Ref: Negative) MODERATE     Nitrite, UA (Ref: Negative) NEG    Comprehensive metabolic panel [[id number]]  (Abnormal) Collected:  01/21/20 1348    Lab Status:  Final result Specimen:  Blood from Arm, Right Updated:  01/21/20 1414     Sodium 144 mmol/L      Potassium 3 4 mmol/L      Chloride 107 mmol/L      CO2 28 mmol/L      ANION GAP 9 mmol/L      BUN 12 mg/dL      Creatinine 0 85 mg/dL      Glucose 81 mg/dL      Calcium 9 4 mg/dL      AST 16 U/L      ALT 26 U/L      Alkaline Phosphatase 50 U/L      Total Protein 7 4 g/dL      Albumin 3 8 g/dL      Total Bilirubin 0 30 mg/dL      eGFR 96 ml/min/1 73sq m     Narrative:       [place] guidelines for Chronic Kidney Disease (CKD):     Stage 1 with normal or high GFR (GFR > 90 mL/min/1 73 square meters)    Stage 2 Mild CKD (GFR = 60-89 mL/min/1 73 square meters)    Stage 3A Moderate CKD (GFR = 45-59 mL/min/1 73 square meters)    Stage 3B Moderate CKD (GFR = 30-44 mL/min/1 73 square meters)    Stage 4 Severe CKD (GFR = 15-29 mL/min/1 73 square meters)    Stage 5 End Stage CKD (GFR <15 mL/min/1 73 square meters)  Note: GFR calculation is accurate only with a steady state creatinine    Lipase [[id number]]  (Normal) Collected:  01/21/20 1348    Lab Status:  Final result Specimen:  Blood from Arm, Right Updated:  01/21/20 1414     Lipase 122 u/L     CBC and differential [[id number]] Collected:  01/21/20 1347    Lab Status:  Final result Specimen:  Blood from Arm, Right Updated:  01/21/20 1357     WBC 7 78 Thousand/uL      RBC 4 89 Million/uL      Hemoglobin 14 9 g/dL      Hematocrit 43 0 %      MCV 88 fL      MCH 30 5 pg      MCHC 34 7 g/dL      RDW 12 0 %      MPV 11 0 fL      Platelets 511 Thousands/uL      nRBC 0 /100 WBCs      Neutrophils Relative 51 %      Immat GRANS % 0 %      Lymphocytes Relative 39 %      Monocytes Relative 7 %      Eosinophils Relative 2 %      Basophils Relative 1 %      Neutrophils Absolute 3 93 Thousands/µL      Immature Grans Absolute 0 02 Thousand/uL      Lymphocytes Absolute 3 04 Thousands/µL      Monocytes Absolute 0 56 Thousand/µL      Eosinophils Absolute 0 19 Thousand/µL      Basophils Absolute 0 04 Thousands/µL     POCT pregnancy, urine [[id number]]  (Normal) Resulted:  01/21/20 1344    Lab Status:  Final result Specimen:  Urine Updated:  01/21/20 1344     EXT PREG TEST UR (Ref: Negative) NEGATIVE     Control VALID                 CT renal stone study abdomen pelvis without contrast   Final Result by Ted Steele MD (01/21 3464)      3 mm calcific density at the midline posterior bladder wall, favored to be an intraluminal calculus  No other urinary calculi  No obstructive uropathy  Workstation performed: [id number]                    Procedures  Procedures         ED Course                               MDM  Number of Diagnoses or Management Options  Left flank pain: new and requires workup  Renal colic on left side: new and requires workup  Diagnosis management comments: Patient with left flank pain, that resolved prior to CT scan, d/w patient possibility of recently passed kidney stone  Will send patient to urologist for recheck          Amount and/or Complexity of Data Reviewed  Clinical lab tests: ordered and reviewed  Tests in the radiology section of CPT®: ordered and reviewed    Patient Progress  Patient progress: stable        Disposition  Final diagnoses:   Left flank pain   Renal colic on left side     Time reflects when diagnosis was documented in both MDM as applicable and the Disposition within this note     Time User Action Codes Description Comment    1/21/2020  3:44 PM Sergei Finley Add [R10 9] Left flank pain     1/21/2020  3:44 PM Sergei Finley Add [U64] Renal colic on left side       ED Disposition     ED Disposition Condition Date/Time Comment    Discharge Stable Tue Jan 21, 2020  3:43 PM Particia Border discharge to home/self care  Follow-up Information     Follow up With Specialties Details Why Contact Info    Janeth Amin MD Urology Call in 3 days For recheck, As needed [facility]  [address] [place] [address]  [phone]            Discharge Medication List as of 1/21/2020  3:45 PM      START taking these medications    Details   traMADol (ULTRAM) 50 mg tablet Take 1 tablet (50 mg total) by mouth every 6 (six) hours as needed for moderate pain, Starting Tue 1/21/2020, Normal         CONTINUE these medications which have NOT CHANGED    Details   Multiple Vitamins-Minerals (MULTIVITAMIN WITH MINERALS) tablet Take 1 tablet by mouth daily, Historical Med      JUNEL FE 1/20 1-20 MG-MCG per tablet Take 1 tablet by mouth daily, Starting Wed 3/14/2018, Historical Med           No discharge procedures on file      ED Provider  Electronically Signed by           Peter Esqueda PA-C  01/21/20 2879

## 2020-01-22 ENCOUNTER — TELEPHONE (OUTPATIENT)
Dept: UROLOGY | Facility: MEDICAL CENTER | Age: 25
End: 2020-01-22

## 2020-01-22 DIAGNOSIS — N20.0 NEPHROLITHIASIS: Primary | ICD-10-CM

## 2020-01-22 NOTE — TELEPHONE ENCOUNTER
Called patient back she is dropping of stone tomorrow    Scheduled her in 8 weeks with [facility]  And stone analysis

## 2020-01-22 NOTE — TELEPHONE ENCOUNTER
Please Triage - ER Follow Up  Patient of Dr Ning Bowman seen in [facility]  Patient seen at [address] ER on 1/21  Labs and Imaging in [facility]     CT 1/21     IMPRESSION:     3 mm calcific density at the midline posterior bladder wall, favored to be an intraluminal calculus      No other urinary calculi  No obstructive uropathy    Patient stated she believes she passed the stone and has it in a container       She can be reached at [phone]

## 2020-01-23 DIAGNOSIS — N20.0 NEPHROLITHIASIS: Primary | ICD-10-CM

## 2020-01-27 ENCOUNTER — OFFICE VISIT (OUTPATIENT)
Dept: FAMILY MEDICINE CLINIC | Facility: HOSPITAL | Age: 25
End: 2020-01-27
Payer: COMMERCIAL

## 2020-01-27 VITALS
HEIGHT: 65 IN | HEART RATE: 87 BPM | SYSTOLIC BLOOD PRESSURE: 128 MMHG | WEIGHT: 165.4 LBS | BODY MASS INDEX: 27.56 KG/M2 | DIASTOLIC BLOOD PRESSURE: 80 MMHG | TEMPERATURE: 97.8 F

## 2020-01-27 DIAGNOSIS — E66.3 OVERWEIGHT (BMI 25.0-29.9): ICD-10-CM

## 2020-01-27 DIAGNOSIS — Z00.00 ANNUAL PHYSICAL EXAM: Primary | ICD-10-CM

## 2020-01-27 PROCEDURE — 99395 PREV VISIT EST AGE 18-39: CPT | Performed by: FAMILY MEDICINE

## 2020-01-27 NOTE — PROGRESS NOTES
Josh Parsons MD    NAME: Emilio Griffin  AGE: 22 y o  SEX: female  : 1995     DATE: 2020     Assessment and Plan:     Problem List Items Addressed This Visit     None      Visit Diagnoses     Overweight (BMI 25 0-29 9)    -  Primary    Annual physical exam             1  Discussed lipid screening/cardiovascular screening  She would like to defer this at this point in time  No strong family history of coronary artery disease or cerebrovascular accident  She will work on dietary control and exercise  Diabetes screening is up-to-date as last blood sugar was normal     2  Immunizations  Discussed DTaP and HPV vaccinations  Handout given  She will look into this a little more closely and let us know  3  She is up-to-date with cervical cancer screening  She is on birth control  Doing well with the birth control  BMI Counseling: Body mass index is 27 52 kg/m²  The BMI is above normal  Nutrition recommendations include decreasing portion sizes, encouraging healthy choices of fruits and vegetables, decreasing fast food intake, limiting drinks that contain sugar and moderation in carbohydrate intake  Exercise recommendations include moderate physical activity 150 minutes/week  Subjective:   Chief Complaint   Patient presents with    Annual Exam    Nephrolithiasis     Passed 1 last week - seen at the ER last week        Patient ID: Emilio Griffin is a 22 y o  female  HPI    Here for annual physical examination  Last week she was seen in the emergency room for pain  She has actually passed a kidney stone  She has been in contact with Urology  Stone analysis to be done through Urology  She is feeling well today  Immunizations and preventive care screenings were discussed with patient today  Appropriate education was printed on patient's after visit summary      Counseling:  Alcohol/drug use: discussed moderation in alcohol intake, the recommendations for healthy alcohol use, and avoidance of illicit drug use  Dental Health: discussed importance of regular tooth brushing, flossing, and dental visits  Injury prevention: discussed safety/seat belts, safety helmets, smoke detectors, carbon dioxide detectors, and smoking near bedding or upholstery  Sexual health: discussed sexually transmitted diseases, partner selection, use of condoms, avoidance of unintended pregnancy, and contraceptive alternatives  · Exercise: the importance of regular exercise/physical activity was discussed  Recommend exercise 3-5 times per week for at least 30 minutes  No follow-ups on file  Chief Complaint:     Chief Complaint   Patient presents with    Annual Exam    Nephrolithiasis     Passed 1 last week - seen at the ER last week      History of Present Illness:     Adult Annual Physical   Patient here for a comprehensive physical exam  The patient reports no problems  Diet and Physical Activity  · Diet/Nutrition: well balanced diet  · Exercise: no formal exercise  Depression Screening  PHQ-9 Depression Screening    PHQ-9:    Frequency of the following problems over the past two weeks:       Little interest or pleasure in doing things:  0 - not at all  Feeling down, depressed, or hopeless:  0 - not at all  PHQ-2 Score:  0       General Health  · Sleep: sleeps well  · Hearing: normal - bilateral   · Vision: vision problems: noting decrease vision and plans on seeing optometry  · Dental: regular dental visits  /GYN Health  · Last menstrual period:   · Contraceptive method: oral contraceptives  · History of STDs?: no      Review of Systems:     Review of Systems   Constitutional: Negative  Negative for activity change, appetite change, chills, diaphoresis, fatigue and fever  HENT: Negative for congestion, facial swelling and sore throat  Respiratory: Negative    Negative for apnea, cough, chest tightness and shortness of breath  Cardiovascular: Negative  Negative for chest pain and palpitations  Gastrointestinal: Negative  Negative for abdominal distention, abdominal pain, blood in stool, constipation, diarrhea and nausea  Genitourinary: Negative  Negative for difficulty urinating, dysuria, flank pain and frequency  Past Medical History:     Past Medical History:   Diagnosis Date    GERD (gastroesophageal reflux disease)     [place]   10/12/15     Lactose intolerance     [place]   12/8/16      Past Surgical History:     Past Surgical History:   Procedure Laterality Date    TONSILLECTOMY      TOOTH EXTRACTION  2012      Social History:     Social History     Socioeconomic History    Marital status: /Civil Union     Spouse name: None    Number of children: None    Years of education: None    Highest education level: None   Occupational History    None   Social Needs    Financial resource strain: None    Food insecurity:     Worry: None     Inability: None    Transportation needs:     Medical: None     Non-medical: None   Tobacco Use    Smoking status: Former Smoker    Smokeless tobacco: Never Used   Substance and Sexual Activity    Alcohol use: Yes     Comment: social    Drug use: No    Sexual activity: None     Comment: Oral contraceptive use    Lifestyle    Physical activity:     Days per week: None     Minutes per session: None    Stress: None   Relationships    Social connections:     Talks on phone: None     Gets together: None     Attends [employer] service: None     Active member of club or organization: None     Attends meetings of clubs or organizations: None     Relationship status: None    Intimate partner violence:     Fear of current or ex partner: None     Emotionally abused: None     Physically abused: None     Forced sexual activity: None   Other Topics Concern    None   Social History Narrative    None      Family History:     Family History Problem Relation Age of Onset    Diabetes Father     Breast cancer Paternal Grandmother     Diabetes Paternal Grandfather       Current Medications:     Current Outpatient Medications   Medication Sig Dispense Refill    JUNEL FE 1/20 1-20 MG-MCG per tablet Take 1 tablet by mouth daily  2    Multiple Vitamins-Minerals (MULTIVITAMIN WITH MINERALS) tablet Take 1 tablet by mouth daily       No current facility-administered medications for this visit  Allergies:     No Known Allergies   Physical Exam:     /80   Pulse 87   Temp 97 8 °F (36 6 °C)   Ht 5' 5" (1 651 m)   Wt 75 kg (165 lb 6 4 oz)   BMI 27 52 kg/m²     Physical Exam   Constitutional: She is oriented to person, place, and time  She appears well-developed and well-nourished  HENT:   Head: Normocephalic and atraumatic  Right Ear: External ear normal    Left Ear: External ear normal    Nose: Nose normal    Mouth/Throat: Oropharynx is clear and moist    Eyes: Pupils are equal, round, and reactive to light  Conjunctivae and EOM are normal    Neck: Normal range of motion  Neck supple  No tracheal deviation present  No thyromegaly present  Cardiovascular: Normal rate, regular rhythm and normal heart sounds  No murmur heard  Pulmonary/Chest: Effort normal and breath sounds normal  No respiratory distress  She has no wheezes  Abdominal: Soft  Bowel sounds are normal    Musculoskeletal: Normal range of motion  Neurological: She is oriented to person, place, and time  Skin: Skin is warm and dry  Capillary refill takes less than 2 seconds  Psychiatric: She has a normal mood and affect  Her behavior is normal    Nursing note and vitals reviewed        MD Maria Esther Nuñez MD

## 2020-01-27 NOTE — PATIENT INSTRUCTIONS

## 2020-01-28 LAB
NIDUS STONE QL: NORMAL
ORIGIN STONE: NORMAL
WT STONE: 0 G

## 2020-02-22 ENCOUNTER — OFFICE VISIT (OUTPATIENT)
Dept: URGENT CARE | Facility: CLINIC | Age: 25
End: 2020-02-22
Payer: COMMERCIAL

## 2020-02-22 VITALS
RESPIRATION RATE: 14 BRPM | WEIGHT: 162 LBS | TEMPERATURE: 99 F | HEART RATE: 70 BPM | HEIGHT: 64 IN | OXYGEN SATURATION: 98 % | DIASTOLIC BLOOD PRESSURE: 67 MMHG | SYSTOLIC BLOOD PRESSURE: 132 MMHG | BODY MASS INDEX: 27.66 KG/M2

## 2020-02-22 DIAGNOSIS — L30.9 DERMATITIS: Primary | ICD-10-CM

## 2020-02-22 PROCEDURE — 99213 OFFICE O/P EST LOW 20 MIN: CPT | Performed by: PHYSICIAN ASSISTANT

## 2020-02-23 NOTE — PROGRESS NOTES
[id number] [facility]        NAME: Bridger Enciso is a 22 y o  female  : 1995    MRN: [id number]  DATE: 2020  TIME: 8:39 AM    Assessment and Plan   Dermatitis [L30 9]  1  Dermatitis  triamcinolone (KENALOG) 0 1 % ointment     Keratosis Pilaris on BL upper arms with similar area on upper back  Patient Instructions   Patient Instructions   Dermatitis   WHAT YOU NEED TO KNOW:   Dermatitis is skin inflammation  You may have an itchy rash, redness, or swelling  You may also have bumps or blisters that crust over or ooze clear fluid  Dermatitis can be caused by allergens such as dust mites, pet dander, pollen, and certain foods  It can also develop when something touches your skin and irritates it or causes an allergic reaction  Examples include soaps, chemicals, latex, and poison ivy  DISCHARGE INSTRUCTIONS:   Call 911 if you have any of the following symptoms of anaphylaxis:   · Sudden trouble breathing    · Throat swelling and tightness    · Dizziness, lightheadedness, fainting, or confusion  Return to the emergency department if:   · You develop a fever or have red streaks going up your arm or leg  · Your rash gets more swollen, red, or hot  Contact your healthcare provider if:   · Your skin blisters, oozes white or yellow pus, or has a foul-smelling discharge  · Your rash spreads or does not get better, even after treatment  · You have questions or concerns about your condition or care  Medicines:   · Medicines  help decrease itching and inflammation, or treat a bacterial infection  They may be given as a topical cream, shot, or a pill  · Take your medicine as directed  Contact your healthcare provider if you think your medicine is not helping or if you have side effects  Tell him of her if you are allergic to any medicine  Keep a list of the medicines, vitamins, and herbs you take  Include the amounts, and when and why you take them   Bring the list or the pill bottles to Negative  Neurological: Negative  Current Medications       Current Outpatient Medications:     JUNEL FE 1/20 1-20 MG-MCG per tablet, Take 1 tablet by mouth daily, Disp: , Rfl: 2    Multiple Vitamins-Minerals (MULTIVITAMIN WITH MINERALS) tablet, Take 1 tablet by mouth daily, Disp: , Rfl:     triamcinolone (KENALOG) 0 1 % ointment, Apply topically 2 (two) times a day, Disp: 30 g, Rfl: 0    Current Allergies     Allergies as of 02/22/2020    (No Known Allergies)            The following portions of the patient's history were reviewed and updated as appropriate: allergies, current medications, past family history, past medical history, past social history, past surgical history and problem list      Past Medical History:   Diagnosis Date    GERD (gastroesophageal reflux disease)     [place]   10/12/15     Lactose intolerance     [place]   12/8/16       Past Surgical History:   Procedure Laterality Date    TONSILLECTOMY      TOOTH EXTRACTION  2012       Family History   Problem Relation Age of Onset    Diabetes Father     Breast cancer Paternal Grandmother     Diabetes Paternal Grandfather          Medications have been verified  Objective   /67 (BP Location: Left arm, Patient Position: Sitting, Cuff Size: Standard)   Pulse 70   Temp 99 °F (37 2 °C)   Resp 14   Ht 5' 4" (1 626 m)   Wt 73 5 kg (162 lb)   SpO2 98%   BMI 27 81 kg/m²        Physical Exam     Physical Exam   Constitutional: She is oriented to person, place, and time  She appears well-developed  No distress  HENT:   Mouth/Throat: Oropharynx is clear and moist    Eyes: Conjunctivae are normal    Cardiovascular: Normal rate and regular rhythm  Pulmonary/Chest: Effort normal and breath sounds normal    Musculoskeletal: Normal range of motion  Neurological: She is alert and oriented to person, place, and time  Skin:   Linear papular rash across left shoulder blade with no vesicles or pustules    No surrounding erythema or drainage  Vitals reviewed

## 2020-02-24 NOTE — PATIENT INSTRUCTIONS
Dermatitis   WHAT YOU NEED TO KNOW:   Dermatitis is skin inflammation  You may have an itchy rash, redness, or swelling  You may also have bumps or blisters that crust over or ooze clear fluid  Dermatitis can be caused by allergens such as dust mites, pet dander, pollen, and certain foods  It can also develop when something touches your skin and irritates it or causes an allergic reaction  Examples include soaps, chemicals, latex, and poison ivy  DISCHARGE INSTRUCTIONS:   Call 911 if you have any of the following symptoms of anaphylaxis:   · Sudden trouble breathing    · Throat swelling and tightness    · Dizziness, lightheadedness, fainting, or confusion  Return to the emergency department if:   · You develop a fever or have red streaks going up your arm or leg  · Your rash gets more swollen, red, or hot  Contact your healthcare provider if:   · Your skin blisters, oozes white or yellow pus, or has a foul-smelling discharge  · Your rash spreads or does not get better, even after treatment  · You have questions or concerns about your condition or care  Medicines:   · Medicines  help decrease itching and inflammation, or treat a bacterial infection  They may be given as a topical cream, shot, or a pill  · Take your medicine as directed  Contact your healthcare provider if you think your medicine is not helping or if you have side effects  Tell him of her if you are allergic to any medicine  Keep a list of the medicines, vitamins, and herbs you take  Include the amounts, and when and why you take them  Bring the list or the pill bottles to follow-up visits  Carry your medicine list with you in case of an emergency  Apply a cool compress to your rash: This will help soothe your skin  Keep your skin moist:  Rub unscented cream or lotion on your skin to prevent dryness and itching  Do this right after a lukewarm bath or shower when your skin is still damp    Avoid skin irritants:  Do not use skin irritants, such as makeup, hair products, soaps, and cleansers  Use products that do not contain fragrances or dye  Follow up with your healthcare provider as directed:  Write down your questions so you remember to ask them during your visits  © 2017 [employer] Information is for End User's use only and may not be sold, redistributed or otherwise used for commercial purposes  All illustrations and images included in CareNotes® are the copyrighted property of A [employer] M [employer]  or [employer]  The above information is an  only  It is not intended as medical advice for individual conditions or treatments  Talk to your doctor, nurse or pharmacist before following any medical regimen to see if it is safe and effective for you

## 2020-05-20 ENCOUNTER — TELEPHONE (OUTPATIENT)
Dept: UROLOGY | Facility: AMBULATORY SURGERY CENTER | Age: 25
End: 2020-05-20

## 2020-05-26 ENCOUNTER — HOSPITAL ENCOUNTER (OUTPATIENT)
Dept: RADIOLOGY | Facility: HOSPITAL | Age: 25
Discharge: HOME | End: 2020-05-26
Payer: COMMERCIAL

## 2020-05-26 DIAGNOSIS — N20.0 NEPHROLITHIASIS: ICD-10-CM

## 2020-05-26 PROCEDURE — 74018 RADEX ABDOMEN 1 VIEW: CPT

## 2020-06-02 ENCOUNTER — TELEMEDICINE (OUTPATIENT)
Dept: UROLOGY | Facility: AMBULATORY SURGERY CENTER | Age: 25
End: 2020-06-02
Payer: COMMERCIAL

## 2020-06-02 DIAGNOSIS — N20.0 NEPHROLITHIASIS: Primary | ICD-10-CM

## 2020-06-02 PROCEDURE — 99214 OFFICE O/P EST MOD 30 MIN: CPT | Performed by: UROLOGY

## 2020-07-06 ENCOUNTER — TELEMEDICINE (OUTPATIENT)
Dept: FAMILY MEDICINE CLINIC | Facility: HOSPITAL | Age: 25
End: 2020-07-06
Payer: COMMERCIAL

## 2020-07-06 VITALS — BODY MASS INDEX: 27.86 KG/M2 | WEIGHT: 163.2 LBS | HEIGHT: 64 IN | TEMPERATURE: 98.6 F

## 2020-07-06 DIAGNOSIS — R51.9 ACUTE NONINTRACTABLE HEADACHE, UNSPECIFIED HEADACHE TYPE: Primary | ICD-10-CM

## 2020-07-06 PROCEDURE — 99213 OFFICE O/P EST LOW 20 MIN: CPT | Performed by: NURSE PRACTITIONER

## 2020-07-06 PROCEDURE — 1036F TOBACCO NON-USER: CPT | Performed by: NURSE PRACTITIONER

## 2020-07-06 PROCEDURE — 3008F BODY MASS INDEX DOCD: CPT | Performed by: NURSE PRACTITIONER

## 2020-07-06 RX ORDER — FLUTICASONE PROPIONATE 50 MCG
1 SPRAY, SUSPENSION (ML) NASAL DAILY
COMMUNITY

## 2020-07-06 RX ORDER — LORATADINE 10 MG/1
10 TABLET ORAL DAILY
COMMUNITY

## 2020-07-06 NOTE — PROGRESS NOTES
Assessment/Plan:    No problem-specific Assessment & Plan notes found for this encounter  Diagnoses and all orders for this visit:    Acute nonintractable headache, unspecified headache type  Comments:  episodic HAs gradually improving (asymptomatic today), doubt viral/covid concern w/ absence of viral sx's; call for OV if worsening/persistent sx's    Other orders  -     fluticasone (FLONASE) 50 mcg/act nasal spray; 1 spray into each nostril daily  -     loratadine (CLARITIN) 10 mg tablet; Take 10 mg by mouth daily          Subjective:      Patient ID: Vy Perez is a 22 y o  female  States she feels fine other than getting a headache daily for the past week  First headache was when sitting by hot bon fire 1 week ago  "Throbbing sensation through whole head"  Feels like it starts in the back and lasts 1-2 hours  Took tylenol a couple times w/relief  Headaches not as bad but they're still happening  Has not had a headache today  Has had headaches like this before, usually w/a sinus infection  She has bad allergies  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  Drinks 60oz of water daily but mouth still feels dry  The following portions of the patient's history were reviewed and updated as appropriate: allergies, current medications, past medical history, past social history, past surgical history and problem list     Review of Systems   Constitutional: Negative for activity change, appetite change, chills and fever  HENT: Positive for congestion  Negative for rhinorrhea  Eyes: Negative for photophobia and visual disturbance  Gastrointestinal: Positive for diarrhea (ongoing for years, lactose intolerant)  Negative for nausea and vomiting  Neurological: Positive for light-headedness (when busy at work) and headaches (occ  throbbing, worst is 6-7/10)  Negative for dizziness, syncope, facial asymmetry, speech difficulty and weakness           Objective:      Temp 98 6 °F (37 °C) (Tympanic)   Ht 5' 4" (1 626 m)   Wt 74 kg (163 lb 3 2 oz)   BMI 28 01 kg/m²          Physical Exam   Constitutional: She is oriented to person, place, and time  She appears well-developed and well-nourished  No distress  HENT:   Head: Normocephalic and atraumatic  Pulmonary/Chest: Effort normal  No respiratory distress  Neurological: She is alert and oriented to person, place, and time  No cranial nerve deficit (CN 2-12 grossly intact)  Psychiatric: She has a normal mood and affect  Her behavior is normal  Judgment and thought content normal    Vitals reviewed

## 2020-08-03 ENCOUNTER — OFFICE VISIT (OUTPATIENT)
Dept: FAMILY MEDICINE CLINIC | Facility: HOSPITAL | Age: 25
End: 2020-08-03
Payer: COMMERCIAL

## 2020-08-03 VITALS
HEIGHT: 64 IN | DIASTOLIC BLOOD PRESSURE: 98 MMHG | WEIGHT: 165.6 LBS | SYSTOLIC BLOOD PRESSURE: 134 MMHG | BODY MASS INDEX: 28.27 KG/M2 | TEMPERATURE: 97.9 F | HEART RATE: 98 BPM

## 2020-08-03 DIAGNOSIS — R21 RASH OF FACE: Primary | ICD-10-CM

## 2020-08-03 PROCEDURE — 99213 OFFICE O/P EST LOW 20 MIN: CPT | Performed by: INTERNAL MEDICINE

## 2020-08-03 PROCEDURE — 1036F TOBACCO NON-USER: CPT | Performed by: INTERNAL MEDICINE

## 2020-08-03 PROCEDURE — 3008F BODY MASS INDEX DOCD: CPT | Performed by: INTERNAL MEDICINE

## 2020-08-03 NOTE — PROGRESS NOTES
Assessment/Plan:       Diagnoses and all orders for this visit:    Rash of face  Comments:  Currently rash not present but pictures reviewed - appears to be a contact dermatitis - likely related to mask - urged to wash with sensitive skin detergent, con't current cream x 10 days as appears to help, if rash reoccurs needs Derm for definitive dx as pt want to know how to prevent this and not just treat it with the cream that has been working  Orders:  -     Ambulatory referral to Dermatology; Future          Subjective:      Patient ID: Horace Ya is a 22 y o  female  HPI Pt here with intermittent facial rash for approx 2-3 mos  She notes no specific new meds/lotions/detergents/make up  She has been wearing a mask at work - works at [employer]  Did note the rash before she went back to work while in quarantine  She notes no new exposures at work  She did switch a tooth paste recently  She has a "steroid" cream at home and when she uses it the rash resolves  She notes no oral lesions/palms/hands and rash does not occur any where else  The rash does not itch and it does not hurt  It will dry up and then resolve  Currently the rash is not present but she has pictures  Review of Systems   Constitutional: Negative for chills and fever  Respiratory: Negative for cough and shortness of breath  Skin: Positive for rash  Objective:    /98   Pulse 98   Temp 97 9 °F (36 6 °C)   Ht 5' 4"   Wt 75 1 kg (165 lb 9 6 oz)   BMI 28 43 kg/m²      Physical Exam   Constitutional: She does not appear ill  Neurological: She is alert  Psychiatric: Mood, judgment and thought content normal    Nursing note and vitals reviewed

## 2020-08-17 ENCOUNTER — TELEMEDICINE (OUTPATIENT)
Dept: FAMILY MEDICINE CLINIC | Facility: HOSPITAL | Age: 25
End: 2020-08-17
Payer: COMMERCIAL

## 2020-08-17 VITALS — BODY MASS INDEX: 28.17 KG/M2 | WEIGHT: 165 LBS | TEMPERATURE: 98.4 F | HEIGHT: 64 IN

## 2020-08-17 DIAGNOSIS — R51.9 NONINTRACTABLE EPISODIC HEADACHE, UNSPECIFIED HEADACHE TYPE: Primary | ICD-10-CM

## 2020-08-17 PROCEDURE — 3008F BODY MASS INDEX DOCD: CPT | Performed by: NURSE PRACTITIONER

## 2020-08-17 PROCEDURE — 3725F SCREEN DEPRESSION PERFORMED: CPT | Performed by: NURSE PRACTITIONER

## 2020-08-17 PROCEDURE — 1036F TOBACCO NON-USER: CPT | Performed by: NURSE PRACTITIONER

## 2020-08-17 PROCEDURE — 99213 OFFICE O/P EST LOW 20 MIN: CPT | Performed by: NURSE PRACTITIONER

## 2020-08-17 RX ORDER — AZITHROMYCIN 250 MG/1
TABLET, FILM COATED ORAL
Qty: 6 EACH | Refills: 0 | Status: SHIPPED | OUTPATIENT
Start: 2020-08-17 | End: 2020-08-28

## 2020-08-17 NOTE — PROGRESS NOTES
Virtual Regular Visit      Assessment/Plan:    Problem List Items Addressed This Visit     None      Visit Diagnoses     Nonintractable episodic headache, unspecified headache type    -  Primary    recurrent; treat w/antibiotic for ? sinusitis; continue flonase qd; use OTC sinus med & ibuprofen prn; call for OV w/worse or persistent sx's    Relevant Medications    azithromycin (ZITHROMAX) 250 mg tablet               Reason for visit is   Chief Complaint   Patient presents with    Headache     x6 days     Virtual Regular Visit        Encounter provider IMANI Rey    Provider located at [address] [place] [address]  [address] [place] [address]      Recent Visits  No visits were found meeting these conditions  Showing recent visits within past 7 days and meeting all other requirements     Today's Visits  Date Type Provider Dept   08/17/20 Telemedicine [facility]   Showing today's visits and meeting all other requirements     Future Appointments  No visits were found meeting these conditions  Showing future appointments within next 150 days and meeting all other requirements        The patient was identified by name and date of birth  Herve Prater was informed that this is a telemedicine visit and that the visit is being conducted through [address] [place] [address] and patient was informed that this is not a secure, HIPAA-complaint platform  She agrees to proceed     My office door was closed  No one else was in the room  She acknowledged consent and understanding of privacy and security of the video platform  The patient has agreed to participate and understands they can discontinue the visit at any time  Patient is aware this is a billable service  Subjective  Herve Prater is a 22 y o  female w/recurrent headache  Headache in early July resolved over about 2 weeks   It has come back again for past 5-6 days and she wonders if she has a sinus infection  Has pressure in ears so took Vicks sinus med yesterday w/relief  Has seasonal allergies so uses flonase and loratadine  Believes flonase has been empty but she has been using not realizing  Past Medical History:   Diagnosis Date    GERD (gastroesophageal reflux disease)     [place]   10/12/15     [place]ose intolerance     [place]   12/8/16       Past Surgical History:   Procedure Laterality Date    TONSILLECTOMY      TOOTH EXTRACTION  2012       Current Outpatient Medications   Medication Sig Dispense Refill    fluticasone (FLONASE) 50 mcg/act nasal spray 1 spray into each nostril daily      JUNEL FE 1/20 1-20 MG-MCG per tablet Take 1 tablet by mouth daily  2    loratadine (CLARITIN) 10 mg tablet Take 10 mg by mouth daily      Multiple Vitamins-Minerals (MULTIVITAMIN WITH MINERALS) tablet Take 1 tablet by mouth daily      azithromycin (ZITHROMAX) 250 mg tablet Take 2 tablets today then 1 tablet daily for 4 days 6 each 0     No current facility-administered medications for this visit  Allergies   Allergen Reactions    Amoxicillin Rash       Review of Systems   Constitutional: Negative for chills and fever  HENT: Positive for ear pain and rhinorrhea  Negative for congestion and sore throat  Respiratory: Negative for cough and shortness of breath  Neurological: Positive for headaches (all over pressure, pulsates sometime through whole head; takes ibuprofen w/relief sometimes but not always)  Video Exam    Vitals:    08/17/20 1243   Temp: 98 4 °F (36 9 °C)   Weight: 74 8 kg (165 lb)   Height: 5' 4" (1 626 m)       Physical Exam  Vitals signs reviewed  Constitutional:       General: She is not in acute distress  Appearance: Normal appearance  HENT:      Head: Normocephalic and atraumatic  Pulmonary:      Effort: Pulmonary effort is normal  No respiratory distress  Comments: No cough  Neurological:      General: No focal deficit present        Mental Status: She is alert and oriented to person, place, and time  Cranial Nerves: No cranial nerve deficit  Psychiatric:         Mood and Affect: Mood normal          Behavior: Behavior normal          Thought Content: Thought content normal          Judgment: Judgment normal           I spent 10 minutes with patient today in which greater than 50% of the time was spent in counseling/coordination of care regarding symptoms and plan of care      [facility] that she has consented to an online visit or consultation  She understands that the online visit is based solely on information provided by her, and that, in the absence of a face-to-face physical evaluation by the physician, the diagnosis she receives is both limited and provisional in terms of accuracy and completeness  This is not intended to replace a full medical face-to-face evaluation by the physician  Farzana Connors understands and accepts these terms

## 2020-08-28 ENCOUNTER — TELEMEDICINE (OUTPATIENT)
Dept: FAMILY MEDICINE CLINIC | Facility: HOSPITAL | Age: 25
End: 2020-08-28
Payer: COMMERCIAL

## 2020-08-28 VITALS — BODY MASS INDEX: 28.17 KG/M2 | WEIGHT: 165 LBS | HEIGHT: 64 IN

## 2020-08-28 DIAGNOSIS — G44.201 ACUTE INTRACTABLE TENSION-TYPE HEADACHE: Primary | ICD-10-CM

## 2020-08-28 PROCEDURE — 99213 OFFICE O/P EST LOW 20 MIN: CPT | Performed by: NURSE PRACTITIONER

## 2020-08-28 PROCEDURE — 3008F BODY MASS INDEX DOCD: CPT | Performed by: NURSE PRACTITIONER

## 2020-08-28 PROCEDURE — 1036F TOBACCO NON-USER: CPT | Performed by: NURSE PRACTITIONER

## 2020-08-28 RX ORDER — PREDNISONE 10 MG/1
TABLET ORAL
Qty: 20 TABLET | Refills: 0 | Status: SHIPPED | OUTPATIENT
Start: 2020-08-28 | End: 2020-09-08

## 2020-08-28 NOTE — PROGRESS NOTES
Virtual Regular Visit      Assessment/Plan:    Problem List Items Addressed This Visit     None      Visit Diagnoses     Acute intractable tension-type headache    -  Primary    take prednisone taper as rx'd & continue allergy meds; if no resolution by next week will need appt in office for further eval; advise Mg & B2 daily    Relevant Medications    predniSONE 10 mg tablet               Reason for visit is   Chief Complaint   Patient presents with    Headache    Virtual Regular Visit        Encounter provider IMANI Matta Ra    Provider located at [address] [place] [address]  [address] [place] [address]      Recent Visits  No visits were found meeting these conditions  Showing recent visits within past 7 days and meeting all other requirements     Today's Visits  Date Type Provider Dept   08/28/20 Telemedicine [facility]   Showing today's visits and meeting all other requirements     Future Appointments  No visits were found meeting these conditions  Showing future appointments within next 150 days and meeting all other requirements        The patient was identified by name and date of birth  Kimberly Bradshaw was informed that this is a telemedicine visit and that the visit is being conducted through [address] [place] [address] and patient was informed that this is not a secure, HIPAA-complaint platform  She agrees to proceed     My office door was closed  No one else was in the room  She acknowledged consent and understanding of privacy and security of the video platform  The patient has agreed to participate and understands they can discontinue the visit at any time  Patient is aware this is a billable service  Subjective  Kimberly Bradshaw is a 22 y o  female w/ongoing headaches   Pt seen virtually for ongoing headaches  Felt like she was a bit better for a few days last week when on antibiotic   Still has a pulsing throbbing headache all the time  Worse in am after waking and before bed  Is able to work and due her usual even with headache  Not painful but feels "agitating"  Took advil about an hour ago when at work w/some relief  Had been off loratadine for allergies but stopped to take benadryl for poison ivy for a few days  Uses flonase regularly  Past Medical History:   Diagnosis Date    GERD (gastroesophageal reflux disease)     [place]   10/12/15     Lactose [place]     [place]   12/8/16       Past Surgical History:   Procedure Laterality Date    TONSILLECTOMY      TOOTH EXTRACTION  2012       Current Outpatient Medications   Medication Sig Dispense Refill    fluticasone (FLONASE) 50 mcg/act nasal spray 1 spray into each nostril daily      JUNEL FE 1/20 1-20 MG-MCG per tablet Take 1 tablet by mouth daily  2    loratadine (CLARITIN) 10 mg tablet Take 10 mg by mouth daily      Multiple Vitamins-Minerals (MULTIVITAMIN WITH MINERALS) tablet Take 1 tablet by mouth daily      predniSONE 10 mg tablet Take 4 tablets today and tomorrow then 3 tablets for 2 days, 2 tablets for 2 days and 1 tablet for 2 days 20 tablet 0     No current facility-administered medications for this visit  Allergies   Allergen Reactions    Amoxicillin Rash       Review of Systems   Constitutional: Negative for chills and fever  Eyes: Negative for photophobia and visual disturbance  Gastrointestinal: Negative for nausea and vomiting  Musculoskeletal: Negative for neck pain  Neurological: Positive for headaches (throbbing, pulsing pain everywhere and constantly)  Negative for dizziness, speech difficulty and light-headedness  Video Exam    Vitals:    08/28/20 1441   Weight: 74 8 kg (165 lb)   Height: 5' 4" (1 626 m)       Physical Exam  Vitals signs reviewed  Constitutional:       General: She is not in acute distress  Appearance: Normal appearance  HENT:      Head: Normocephalic and atraumatic     Eyes:      General: No scleral icterus  Pulmonary:      Effort: Pulmonary effort is normal  No respiratory distress  Neurological:      General: No focal deficit present  Mental Status: She is alert and oriented to person, place, and time  Cranial Nerves: No cranial nerve deficit  Psychiatric:         Mood and Affect: Mood normal          Behavior: Behavior normal          Thought Content: Thought content normal          Judgment: Judgment normal       Comments: Freely conversive w/good eye contact          I spent 10 minutes with patient today in which greater than 50% of the time was spent in counseling/coordination of care regarding symptoms and plan of care      Marleny Barragan acknowledges that she has consented to an online visit or consultation  She understands that the online visit is based solely on information provided by her, and that, in the absence of a face-to-face physical evaluation by the physician, the diagnosis she receives is both limited and provisional in terms of accuracy and completeness  This is not intended to replace a full medical face-to-face evaluation by the physician  Ester Ferrara understands and accepts these terms

## 2020-09-04 ENCOUNTER — TELEPHONE (OUTPATIENT)
Dept: FAMILY MEDICINE CLINIC | Facility: HOSPITAL | Age: 25
End: 2020-09-04

## 2020-09-04 NOTE — TELEPHONE ENCOUNTER
Pt was seen by Sulma Jeter virtually recently for heachaches  She said that she should follow up if they persist  Sulma Jeter told her to follow up in person  I only see same days and waitlists tuesday, could I take one of those?

## 2020-09-08 ENCOUNTER — APPOINTMENT (OUTPATIENT)
Dept: LAB | Facility: HOSPITAL | Age: 25
End: 2020-09-08
Payer: COMMERCIAL

## 2020-09-08 ENCOUNTER — OFFICE VISIT (OUTPATIENT)
Dept: FAMILY MEDICINE CLINIC | Facility: HOSPITAL | Age: 25
End: 2020-09-08
Payer: COMMERCIAL

## 2020-09-08 VITALS
HEIGHT: 64 IN | WEIGHT: 164.8 LBS | BODY MASS INDEX: 28.13 KG/M2 | OXYGEN SATURATION: 100 % | SYSTOLIC BLOOD PRESSURE: 140 MMHG | TEMPERATURE: 98.2 F | HEART RATE: 76 BPM | DIASTOLIC BLOOD PRESSURE: 80 MMHG

## 2020-09-08 DIAGNOSIS — R51.9 ACUTE INTRACTABLE HEADACHE, UNSPECIFIED HEADACHE TYPE: ICD-10-CM

## 2020-09-08 DIAGNOSIS — R51.9 ACUTE INTRACTABLE HEADACHE, UNSPECIFIED HEADACHE TYPE: Primary | ICD-10-CM

## 2020-09-08 LAB
ALBUMIN SERPL BCP-MCNC: 4.1 G/DL (ref 3.5–5)
ALP SERPL-CCNC: 46 U/L (ref 46–116)
ALT SERPL W P-5'-P-CCNC: 23 U/L (ref 12–78)
ANION GAP SERPL CALCULATED.3IONS-SCNC: 6 MMOL/L (ref 4–13)
AST SERPL W P-5'-P-CCNC: 18 U/L (ref 5–45)
BASOPHILS # BLD AUTO: 0.05 THOUSANDS/ΜL (ref 0–0.1)
BASOPHILS NFR BLD AUTO: 1 % (ref 0–1)
BILIRUB SERPL-MCNC: 0.52 MG/DL (ref 0.2–1)
BUN SERPL-MCNC: 9 MG/DL (ref 5–25)
CALCIUM SERPL-MCNC: 9.6 MG/DL (ref 8.3–10.1)
CHLORIDE SERPL-SCNC: 108 MMOL/L (ref 100–108)
CO2 SERPL-SCNC: 27 MMOL/L (ref 21–32)
CREAT SERPL-MCNC: 0.73 MG/DL (ref 0.6–1.3)
EOSINOPHIL # BLD AUTO: 0.19 THOUSAND/ΜL (ref 0–0.61)
EOSINOPHIL NFR BLD AUTO: 2 % (ref 0–6)
ERYTHROCYTE [DISTWIDTH] IN BLOOD BY AUTOMATED COUNT: 12.3 % (ref 11.6–15.1)
GFR SERPL CREATININE-BSD FRML MDRD: 115 ML/MIN/1.73SQ M
GLUCOSE P FAST SERPL-MCNC: 83 MG/DL (ref 65–99)
HCT VFR BLD AUTO: 46.3 % (ref 34.8–46.1)
HGB BLD-MCNC: 15.4 G/DL (ref 11.5–15.4)
IMM GRANULOCYTES # BLD AUTO: 0.04 THOUSAND/UL (ref 0–0.2)
IMM GRANULOCYTES NFR BLD AUTO: 1 % (ref 0–2)
LYMPHOCYTES # BLD AUTO: 2.47 THOUSANDS/ΜL (ref 0.6–4.47)
LYMPHOCYTES NFR BLD AUTO: 31 % (ref 14–44)
MCH RBC QN AUTO: 30 PG (ref 26.8–34.3)
MCHC RBC AUTO-ENTMCNC: 33.3 G/DL (ref 31.4–37.4)
MCV RBC AUTO: 90 FL (ref 82–98)
MONOCYTES # BLD AUTO: 0.46 THOUSAND/ΜL (ref 0.17–1.22)
MONOCYTES NFR BLD AUTO: 6 % (ref 4–12)
NEUTROPHILS # BLD AUTO: 4.73 THOUSANDS/ΜL (ref 1.85–7.62)
NEUTS SEG NFR BLD AUTO: 59 % (ref 43–75)
NRBC BLD AUTO-RTO: 0 /100 WBCS
PLATELET # BLD AUTO: 209 THOUSANDS/UL (ref 149–390)
PMV BLD AUTO: 11.5 FL (ref 8.9–12.7)
POTASSIUM SERPL-SCNC: 3.8 MMOL/L (ref 3.5–5.3)
PROT SERPL-MCNC: 7.7 G/DL (ref 6.4–8.2)
RBC # BLD AUTO: 5.14 MILLION/UL (ref 3.81–5.12)
SODIUM SERPL-SCNC: 141 MMOL/L (ref 136–145)
T4 FREE SERPL-MCNC: 1.19 NG/DL (ref 0.76–1.46)
TSH SERPL DL<=0.05 MIU/L-ACNC: 1.53 UIU/ML (ref 0.36–3.74)
WBC # BLD AUTO: 7.94 THOUSAND/UL (ref 4.31–10.16)

## 2020-09-08 PROCEDURE — 80053 COMPREHEN METABOLIC PANEL: CPT

## 2020-09-08 PROCEDURE — 36415 COLL VENOUS BLD VENIPUNCTURE: CPT

## 2020-09-08 PROCEDURE — 84439 ASSAY OF FREE THYROXINE: CPT

## 2020-09-08 PROCEDURE — 84443 ASSAY THYROID STIM HORMONE: CPT

## 2020-09-08 PROCEDURE — 85025 COMPLETE CBC W/AUTO DIFF WBC: CPT

## 2020-09-08 PROCEDURE — 99214 OFFICE O/P EST MOD 30 MIN: CPT | Performed by: NURSE PRACTITIONER

## 2020-09-08 PROCEDURE — 1036F TOBACCO NON-USER: CPT | Performed by: NURSE PRACTITIONER

## 2020-09-08 PROCEDURE — 86618 LYME DISEASE ANTIBODY: CPT

## 2020-09-08 RX ORDER — CEFUROXIME AXETIL 500 MG/1
500 TABLET ORAL EVERY 12 HOURS SCHEDULED
Qty: 20 TABLET | Refills: 0 | Status: SHIPPED | OUTPATIENT
Start: 2020-09-08 | End: 2020-09-18

## 2020-09-08 RX ORDER — BUTALBITAL, ACETAMINOPHEN AND CAFFEINE 50; 325; 40 MG/1; MG/1; MG/1
1 TABLET ORAL EVERY 6 HOURS PRN
Qty: 30 TABLET | Refills: 0 | Status: SHIPPED | OUTPATIENT
Start: 2020-09-08 | End: 2022-03-28

## 2020-09-08 NOTE — PROGRESS NOTES
Assessment/Plan:    No problem-specific Assessment & Plan notes found for this encounter  Diagnoses and all orders for this visit:    Acute intractable headache, unspecified headache type  Comments:  given persistence, eval further w/labs, rx longer antibiotic for ? sinusitis, continue nasal spray qd, netti pot, & advil prn; may need imaging if persistent  Orders:  -     CBC and differential; Future  -     Comprehensive metabolic panel; Future  -     TSH, 3rd generation; Future  -     T4, free; Future  -     Lyme Antibody Profile with reflex to WB; Future  -     cefuroxime (CEFTIN) 500 mg tablet; Take 1 tablet (500 mg total) by mouth every 12 (twelve) hours for 10 days  -     butalbital-acetaminophen-caffeine (FIORICET,ESGIC) -40 mg per tablet; Take 1 tablet by mouth every 6 (six) hours as needed for headaches      Flu and adacel declined  Subjective:      Patient ID: Boyd Hancock is a 22 y o  female  Feels like headache is changing  Headache can be a mild 2/10 and is often worse after a full day of work and has to go right to bed  Feels sinus pressure in forehead again and has had constant pressure in ears for a week  Antibiotic last month may have helped but has gotten worse again since last week  Finished prednisone 3 days ago without any benefit  Got a rash on legs and chest with using prednisone  Some help w/advil 600mg and no help with tylenol  Used netti pot past 2 days w/some benefit  Started magnesium and B2 last week  Non smoker  The following portions of the patient's history were reviewed and updated as appropriate: allergies, current medications, past family history, past medical history, past social history, past surgical history and problem list     Review of Systems   Constitutional: Negative for activity change, appetite change, chills and fever  HENT: Positive for rhinorrhea (she attributes to allergies, clear drainage) and sinus pressure (forehead)   Negative for congestion  Eyes: Positive for photophobia ("a little bit")  Negative for visual disturbance (no recent eye exam)  Respiratory: Negative for cough and shortness of breath  Gastrointestinal: Positive for nausea (last week)  Negative for vomiting  Skin: Positive for rash  Neurological: Positive for dizziness (vertigo "out of my body" w/worse headache), light-headedness and headaches  Negative for tremors, syncope, speech difficulty and weakness  Objective:      /80 (Patient Position: Sitting, Cuff Size: Standard)   Pulse 76   Temp 98 2 °F (36 8 °C) (Tympanic)   Ht 5' 4" (1 626 m)   Wt 74 8 kg (164 lb 12 8 oz)   SpO2 100%   BMI 28 29 kg/m²          Physical Exam  Vitals signs reviewed  Constitutional:       General: She is not in acute distress  Appearance: Normal appearance  HENT:      Head: Normocephalic and atraumatic  Right Ear: Tympanic membrane normal       Left Ear: Tympanic membrane normal       Nose: Nose normal       Mouth/Throat:      Pharynx: Oropharynx is clear  Comments: Tonsils absent  Eyes:      General: No scleral icterus  Extraocular Movements: Extraocular movements intact  Conjunctiva/sclera: Conjunctivae normal       Pupils: Pupils are equal, round, and reactive to light  Neck:      Musculoskeletal: Normal range of motion and neck supple  No neck rigidity  Thyroid: No thyromegaly  Cardiovascular:      Rate and Rhythm: Normal rate and regular rhythm  Pulmonary:      Effort: Pulmonary effort is normal  No respiratory distress  Breath sounds: Normal breath sounds  Lymphadenopathy:      Cervical: No cervical adenopathy  Skin:     General: Skin is warm and dry  Findings: Rash (right inner knee to lower leg w/faint pink, confluent, sl scaly rash) present  Neurological:      General: No focal deficit present  Mental Status: She is alert and oriented to person, place, and time        Cranial Nerves: No cranial nerve deficit  Motor: No weakness  Gait: Gait normal    Psychiatric:         Mood and Affect: Mood normal          Behavior: Behavior normal          Thought Content:  Thought content normal          Judgment: Judgment normal

## 2020-09-09 LAB — B BURGDOR IGG+IGM SER-ACNC: <0.91 ISR (ref 0–0.9)

## 2020-11-02 ENCOUNTER — TELEMEDICINE (OUTPATIENT)
Dept: FAMILY MEDICINE CLINIC | Facility: HOSPITAL | Age: 25
End: 2020-11-02
Payer: COMMERCIAL

## 2020-11-02 ENCOUNTER — TELEPHONE (OUTPATIENT)
Dept: FAMILY MEDICINE CLINIC | Facility: HOSPITAL | Age: 25
End: 2020-11-02

## 2020-11-02 VITALS — WEIGHT: 167 LBS | BODY MASS INDEX: 28.51 KG/M2 | TEMPERATURE: 98.3 F | HEIGHT: 64 IN

## 2020-11-02 DIAGNOSIS — R51.9 FRONTAL HEADACHE: Primary | ICD-10-CM

## 2020-11-02 DIAGNOSIS — J30.89 NON-SEASONAL ALLERGIC RHINITIS, UNSPECIFIED TRIGGER: ICD-10-CM

## 2020-11-02 PROCEDURE — 3008F BODY MASS INDEX DOCD: CPT | Performed by: NURSE PRACTITIONER

## 2020-11-02 PROCEDURE — 1036F TOBACCO NON-USER: CPT | Performed by: NURSE PRACTITIONER

## 2020-11-02 PROCEDURE — 99214 OFFICE O/P EST MOD 30 MIN: CPT | Performed by: NURSE PRACTITIONER

## 2020-11-02 RX ORDER — AZELASTINE 1 MG/ML
SPRAY, METERED NASAL
Qty: 1 BOTTLE | Refills: 0 | Status: SHIPPED | OUTPATIENT
Start: 2020-11-02 | End: 2020-11-27

## 2020-11-24 ENCOUNTER — OFFICE VISIT (OUTPATIENT)
Dept: URGENT CARE | Facility: CLINIC | Age: 25
End: 2020-11-24
Payer: COMMERCIAL

## 2020-11-24 VITALS
DIASTOLIC BLOOD PRESSURE: 70 MMHG | RESPIRATION RATE: 18 BRPM | HEIGHT: 64 IN | TEMPERATURE: 97.7 F | HEART RATE: 74 BPM | BODY MASS INDEX: 29.02 KG/M2 | WEIGHT: 170 LBS | OXYGEN SATURATION: 98 % | SYSTOLIC BLOOD PRESSURE: 120 MMHG

## 2020-11-24 DIAGNOSIS — R30.0 DYSURIA: ICD-10-CM

## 2020-11-24 DIAGNOSIS — N30.01 ACUTE CYSTITIS WITH HEMATURIA: Primary | ICD-10-CM

## 2020-11-24 LAB
SL AMB  POCT GLUCOSE, UA: ABNORMAL
SL AMB LEUKOCYTE ESTERASE,UA: ABNORMAL
SL AMB POCT BILIRUBIN,UA: ABNORMAL
SL AMB POCT BLOOD,UA: ABNORMAL
SL AMB POCT CLARITY,UA: CLEAR
SL AMB POCT COLOR,UA: ABNORMAL
SL AMB POCT KETONES,UA: ABNORMAL
SL AMB POCT NITRITE,UA: ABNORMAL
SL AMB POCT PH,UA: 6
SL AMB POCT SPECIFIC GRAVITY,UA: 1
SL AMB POCT URINE PROTEIN: ABNORMAL
SL AMB POCT UROBILINOGEN: 0.2

## 2020-11-24 PROCEDURE — 99213 OFFICE O/P EST LOW 20 MIN: CPT | Performed by: FAMILY MEDICINE

## 2020-11-24 PROCEDURE — 87186 SC STD MICRODIL/AGAR DIL: CPT | Performed by: FAMILY MEDICINE

## 2020-11-24 PROCEDURE — 87086 URINE CULTURE/COLONY COUNT: CPT | Performed by: FAMILY MEDICINE

## 2020-11-24 PROCEDURE — 87077 CULTURE AEROBIC IDENTIFY: CPT | Performed by: FAMILY MEDICINE

## 2020-11-24 PROCEDURE — 81002 URINALYSIS NONAUTO W/O SCOPE: CPT | Performed by: FAMILY MEDICINE

## 2020-11-24 RX ORDER — SULFAMETHOXAZOLE AND TRIMETHOPRIM 800; 160 MG/1; MG/1
1 TABLET ORAL EVERY 12 HOURS SCHEDULED
Qty: 6 TABLET | Refills: 0 | Status: SHIPPED | OUTPATIENT
Start: 2020-11-24 | End: 2020-11-27

## 2020-11-24 RX ORDER — PHENAZOPYRIDINE HYDROCHLORIDE 100 MG/1
100 TABLET, FILM COATED ORAL 3 TIMES DAILY PRN
Qty: 10 TABLET | Refills: 0 | Status: SHIPPED | OUTPATIENT
Start: 2020-11-24

## 2020-11-25 ENCOUNTER — TELEPHONE (OUTPATIENT)
Dept: FAMILY MEDICINE CLINIC | Facility: HOSPITAL | Age: 25
End: 2020-11-25

## 2020-11-25 DIAGNOSIS — R11.0 NAUSEA: Primary | ICD-10-CM

## 2020-11-25 RX ORDER — ONDANSETRON 4 MG/1
4 TABLET, FILM COATED ORAL EVERY 8 HOURS PRN
Qty: 20 TABLET | Refills: 0 | Status: SHIPPED | OUTPATIENT
Start: 2020-11-25

## 2020-11-26 DIAGNOSIS — R51.9 FRONTAL HEADACHE: ICD-10-CM

## 2020-11-26 LAB — BACTERIA UR CULT: ABNORMAL

## 2020-11-27 RX ORDER — AZELASTINE 1 MG/ML
SPRAY, METERED NASAL
Qty: 1 BOTTLE | Refills: 0 | Status: SHIPPED | OUTPATIENT
Start: 2020-11-27 | End: 2020-12-21

## 2020-12-01 ENCOUNTER — HOSPITAL ENCOUNTER (OUTPATIENT)
Dept: MRI IMAGING | Facility: HOSPITAL | Age: 25
Discharge: HOME | End: 2020-12-01
Payer: COMMERCIAL

## 2020-12-01 DIAGNOSIS — R51.9 FRONTAL HEADACHE: ICD-10-CM

## 2020-12-01 PROCEDURE — A9585 GADOBUTROL INJECTION: HCPCS | Performed by: NURSE PRACTITIONER

## 2020-12-01 PROCEDURE — 70553 MRI BRAIN STEM W/O & W/DYE: CPT

## 2020-12-01 PROCEDURE — G1004 CDSM NDSC: HCPCS

## 2020-12-01 RX ADMIN — GADOBUTROL 7 ML: 604.72 INJECTION INTRAVENOUS at 20:34

## 2020-12-20 DIAGNOSIS — R51.9 FRONTAL HEADACHE: ICD-10-CM

## 2020-12-21 RX ORDER — AZELASTINE 1 MG/ML
SPRAY, METERED NASAL
Qty: 1 BOTTLE | Refills: 1 | Status: SHIPPED | OUTPATIENT
Start: 2020-12-21 | End: 2021-01-22

## 2020-12-28 ENCOUNTER — TELEPHONE (OUTPATIENT)
Dept: FAMILY MEDICINE CLINIC | Facility: HOSPITAL | Age: 25
End: 2020-12-28

## 2020-12-28 DIAGNOSIS — K21.00 GASTROESOPHAGEAL REFLUX DISEASE WITH ESOPHAGITIS WITHOUT HEMORRHAGE: Primary | ICD-10-CM

## 2020-12-28 RX ORDER — OMEPRAZOLE 20 MG/1
20 CAPSULE, DELAYED RELEASE ORAL
Qty: 30 CAPSULE | Refills: 5
Start: 2020-12-28

## 2021-01-22 DIAGNOSIS — R51.9 FRONTAL HEADACHE: ICD-10-CM

## 2021-01-22 RX ORDER — AZELASTINE 1 MG/ML
SPRAY, METERED NASAL
Qty: 1 BOTTLE | Refills: 1 | Status: SHIPPED | OUTPATIENT
Start: 2021-01-22 | End: 2021-02-05

## 2021-02-05 DIAGNOSIS — R51.9 FRONTAL HEADACHE: ICD-10-CM

## 2021-02-05 RX ORDER — AZELASTINE 1 MG/ML
SPRAY, METERED NASAL
Qty: 3 BOTTLE | Refills: 0 | Status: SHIPPED | OUTPATIENT
Start: 2021-02-05 | End: 2021-02-19

## 2021-02-19 DIAGNOSIS — R51.9 FRONTAL HEADACHE: ICD-10-CM

## 2021-02-19 RX ORDER — AZELASTINE 1 MG/ML
SPRAY, METERED NASAL
Qty: 1 BOTTLE | Refills: 1 | Status: SHIPPED | OUTPATIENT
Start: 2021-02-19 | End: 2021-03-05

## 2021-03-05 DIAGNOSIS — R51.9 FRONTAL HEADACHE: ICD-10-CM

## 2021-03-05 RX ORDER — AZELASTINE 1 MG/ML
SPRAY, METERED NASAL
Qty: 3 BOTTLE | Refills: 0 | Status: SHIPPED | OUTPATIENT
Start: 2021-03-05

## 2021-10-04 ENCOUNTER — TELEPHONE (OUTPATIENT)
Dept: FAMILY MEDICINE CLINIC | Facility: HOSPITAL | Age: 26
End: 2021-10-04

## 2021-10-04 DIAGNOSIS — L25.5 CONTACT DERMATITIS DUE TO PLANTS, EXCEPT FOOD, UNSPECIFIED CONTACT DERMATITIS TYPE: Primary | ICD-10-CM

## 2021-10-05 ENCOUNTER — HOSPITAL ENCOUNTER (EMERGENCY)
Facility: HOSPITAL | Age: 26
Discharge: HOME | End: 2021-10-05
Attending: EMERGENCY MEDICINE | Admitting: EMERGENCY MEDICINE
Payer: COMMERCIAL

## 2021-10-05 ENCOUNTER — APPOINTMENT (EMERGENCY)
Dept: CT IMAGING | Facility: HOSPITAL | Age: 26
End: 2021-10-05
Payer: COMMERCIAL

## 2021-10-05 VITALS
RESPIRATION RATE: 16 BRPM | WEIGHT: 170 LBS | DIASTOLIC BLOOD PRESSURE: 75 MMHG | BODY MASS INDEX: 29.02 KG/M2 | OXYGEN SATURATION: 99 % | SYSTOLIC BLOOD PRESSURE: 130 MMHG | HEART RATE: 94 BPM | HEIGHT: 64 IN | TEMPERATURE: 97.4 F

## 2021-10-05 DIAGNOSIS — R10.9 ACUTE LEFT FLANK PAIN: Primary | ICD-10-CM

## 2021-10-05 DIAGNOSIS — R31.29 MICROSCOPIC HEMATURIA: ICD-10-CM

## 2021-10-05 LAB
BACTERIA UR QL AUTO: NORMAL /HPF
BILIRUB UR QL STRIP: NEGATIVE
CLARITY UR: CLEAR
CLARITY, POC: CLEAR
COLOR UR: YELLOW
COLOR, POC: YELLOW
EXT BILIRUBIN, UA: NEGATIVE
EXT BLOOD URINE: NORMAL
EXT GLUCOSE, UA: NEGATIVE
EXT KETONES: NEGATIVE
EXT NITRITE, UA: NEGATIVE
EXT PH, UA: 6
EXT PREG TEST URINE: NEGATIVE
EXT PROTEIN, UA: NEGATIVE
EXT SPECIFIC GRAVITY, UA: 1.02
EXT UROBILINOGEN: 0.2
EXT. CONTROL ED NAV: NORMAL
GLUCOSE UR STRIP-MCNC: NEGATIVE MG/DL
HGB UR QL STRIP.AUTO: ABNORMAL
KETONES UR STRIP-MCNC: NEGATIVE MG/DL
LEUKOCYTE ESTERASE UR QL STRIP: ABNORMAL
NITRITE UR QL STRIP: NEGATIVE
NON-SQ EPI CELLS URNS QL MICRO: NORMAL /HPF
PH UR STRIP.AUTO: 6 [PH] (ref 4.5–8)
PROT UR STRIP-MCNC: NEGATIVE MG/DL
RBC #/AREA URNS AUTO: NORMAL /HPF
SP GR UR STRIP.AUTO: 1.02 (ref 1–1.03)
UROBILINOGEN UR QL STRIP.AUTO: 0.2 E.U./DL
WBC # BLD EST: NORMAL 10*3/UL
WBC #/AREA URNS AUTO: NORMAL /HPF

## 2021-10-05 PROCEDURE — 81001 URINALYSIS AUTO W/SCOPE: CPT

## 2021-10-05 PROCEDURE — 99282 EMERGENCY DEPT VISIT SF MDM: CPT | Performed by: EMERGENCY MEDICINE

## 2021-10-05 PROCEDURE — G1004 CDSM NDSC: HCPCS

## 2021-10-05 PROCEDURE — 81025 URINE PREGNANCY TEST: CPT | Performed by: EMERGENCY MEDICINE

## 2021-10-05 PROCEDURE — 99284 EMERGENCY DEPT VISIT MOD MDM: CPT

## 2021-10-05 PROCEDURE — 81002 URINALYSIS NONAUTO W/O SCOPE: CPT | Performed by: EMERGENCY MEDICINE

## 2021-10-05 PROCEDURE — 74176 CT ABD & PELVIS W/O CONTRAST: CPT

## 2021-10-07 ENCOUNTER — VBI (OUTPATIENT)
Dept: FAMILY MEDICINE CLINIC | Facility: HOSPITAL | Age: 26
End: 2021-10-07

## 2021-10-21 ENCOUNTER — OFFICE VISIT (OUTPATIENT)
Dept: FAMILY MEDICINE CLINIC | Facility: HOSPITAL | Age: 26
End: 2021-10-21
Payer: COMMERCIAL

## 2021-10-21 VITALS
TEMPERATURE: 97.7 F | SYSTOLIC BLOOD PRESSURE: 124 MMHG | DIASTOLIC BLOOD PRESSURE: 82 MMHG | HEART RATE: 68 BPM | HEIGHT: 64 IN | BODY MASS INDEX: 29.19 KG/M2 | WEIGHT: 171 LBS

## 2021-10-21 DIAGNOSIS — H69.82 DYSFUNCTION OF LEFT EUSTACHIAN TUBE: Primary | ICD-10-CM

## 2021-10-21 PROCEDURE — 3008F BODY MASS INDEX DOCD: CPT | Performed by: INTERNAL MEDICINE

## 2021-10-21 PROCEDURE — 99213 OFFICE O/P EST LOW 20 MIN: CPT | Performed by: INTERNAL MEDICINE

## 2021-10-21 PROCEDURE — 1036F TOBACCO NON-USER: CPT | Performed by: INTERNAL MEDICINE

## 2021-12-07 ENCOUNTER — TELEPHONE (OUTPATIENT)
Dept: FAMILY MEDICINE CLINIC | Facility: HOSPITAL | Age: 26
End: 2021-12-07

## 2022-01-13 ENCOUNTER — TELEPHONE (OUTPATIENT)
Dept: FAMILY MEDICINE CLINIC | Facility: HOSPITAL | Age: 27
End: 2022-01-13

## 2022-01-13 DIAGNOSIS — R09.81 SINUS CONGESTION: Primary | ICD-10-CM

## 2022-01-13 DIAGNOSIS — R50.9 FEVER, UNSPECIFIED FEVER CAUSE: ICD-10-CM

## 2022-01-13 NOTE — TELEPHONE ENCOUNTER
Patient spoke with her OB and her OB recommended being seen by us, as she doesn't think it is COVID as Joselito Espitia has had 3 negative at-home COVID testing  Please advise what you would recommend      Justice Grant wanted to switch to you from Dr Debby Mckeon

## 2022-01-13 NOTE — TELEPHONE ENCOUNTER
Please call  Recommend PCR  Covid test only   Please set up for office collection today  If negative and ongoing sytmpoms I woul dlike her to be seen in the office

## 2022-01-13 NOTE — TELEPHONE ENCOUNTER
Calling with complaints of not feeling well  She is pregnant  Last weekend 1/8 she had sinus congestion, low fever 100 7  Home covid test was negative  Sinus congestion has improved this week, has not had a fever  In the past few days she has felt like her chest is "heavy" , her HR is in the 80-90 range at rest, as high as 138 with activity  Took another home covid test yesterday 1/12, negative   Please advise, should she be seen here in the office, should she call her OB, does she need a pcr test?   PCB

## 2022-01-14 ENCOUNTER — OFFICE VISIT (OUTPATIENT)
Dept: FAMILY MEDICINE CLINIC | Facility: HOSPITAL | Age: 27
End: 2022-01-14
Payer: COMMERCIAL

## 2022-01-14 VITALS
HEART RATE: 89 BPM | BODY MASS INDEX: 28.89 KG/M2 | SYSTOLIC BLOOD PRESSURE: 124 MMHG | WEIGHT: 169.2 LBS | HEIGHT: 64 IN | TEMPERATURE: 97.7 F | DIASTOLIC BLOOD PRESSURE: 80 MMHG | OXYGEN SATURATION: 99 %

## 2022-01-14 DIAGNOSIS — Z3A.10 10 WEEKS GESTATION OF PREGNANCY: ICD-10-CM

## 2022-01-14 DIAGNOSIS — R00.2 PALPITATIONS: Primary | ICD-10-CM

## 2022-01-14 PROCEDURE — 3725F SCREEN DEPRESSION PERFORMED: CPT | Performed by: NURSE PRACTITIONER

## 2022-01-14 PROCEDURE — 3008F BODY MASS INDEX DOCD: CPT | Performed by: NURSE PRACTITIONER

## 2022-01-14 PROCEDURE — 1036F TOBACCO NON-USER: CPT | Performed by: NURSE PRACTITIONER

## 2022-01-14 PROCEDURE — 99214 OFFICE O/P EST MOD 30 MIN: CPT | Performed by: NURSE PRACTITIONER

## 2022-01-14 RX ORDER — PYRIDOXINE HCL (VITAMIN B6) 50 MG
50 TABLET ORAL DAILY
COMMUNITY

## 2022-01-14 NOTE — TELEPHONE ENCOUNTER
Pt called back, she went to urgent care and they ran some tests, including a rapid pcr test, thyroid studies and an ekg  [place] [facility] is faxing visit info over to our office, but they recommended she get a holter monitor scheduled  Would you like to see her today in office since her test was negative?

## 2022-01-14 NOTE — TELEPHONE ENCOUNTER
I agree she should do the PCR as Dr Sheridan Knows recommends (today), and if this is negative she can be seen in the office Monday (please schedule her so she has an appt set)

## 2022-01-14 NOTE — PROGRESS NOTES
Assessment/Plan:    No problem-specific Assessment & Plan notes found for this encounter  Diagnoses and all orders for this visit:    Palpitations  Comments:  s/p nml EKG in  w/TSH pending, for reassurance will eval further w/24 holter monitor; reviewed red flag sx's warranting ASAP eval   Orders:  -     Holter monitor; Future    10 weeks gestation of pregnancy  Comments:  continue managment w/OB as planned    Other orders  -     pyridoxine (VITAMIN B6) 50 mg tablet; Take 50 mg by mouth daily  -     doxylamine (UNISON) 25 MG tablet; Take 12 5 mg by mouth daily at bedtime  -     Prenatal MV-Min-Fe Fum-FA-DHA (PRENATAL 1 PO); Take by mouth          Subjective:      Patient ID: Terra Casiano is a 32 y o  female  Has been noting heart pounding in chest and throat for past few days  Has been paying more attention to HR on apple watch and has been higher than normal the past month in the 80-90s (is usually in the 70s)  With activity feels like "it's working harder" in 120s (working as hairdresser, doing laundry)  Woke in the middle of the night with it last night  Went to urgent care earlier and told she had a slight arrhythmia and suggested a holter monitor  Is 10 weeks pregnant  Is "chugging water like crazy"  Has been nauseous and started unisom and B6 as advised by OB  Denies caffeine  Non smoker  Had a head cold last week and covid tested negative x3  Temp last week was 100 7  The following portions of the patient's history were reviewed and updated as appropriate: allergies, current medications, past family history, past medical history, past social history, past surgical history and problem list     Review of Systems   Constitutional: Positive for fatigue (she attributes to pregnancy)  Negative for chills and fever  Respiratory: Positive for chest tightness ("heaviness, like weighted")  Negative for shortness of breath  Cardiovascular: Positive for palpitations   Negative for chest pain and leg swelling  Gastrointestinal: Positive for nausea  Negative for vomiting  Neurological: Positive for headaches ("a bit", occ taking tylenol (last yesterday morning))  Negative for dizziness, syncope and light-headedness  Objective:      /80 (Patient Position: Sitting, Cuff Size: Standard)   Pulse 89   Temp 97 7 °F (36 5 °C) (Tympanic)   Ht 5' 4" (1 626 m)   Wt 76 7 kg (169 lb 3 2 oz)   SpO2 99%   BMI 29 04 kg/m²          Physical Exam  Vitals reviewed  Constitutional:       General: She is not in acute distress  Appearance: Normal appearance  HENT:      Head: Normocephalic  Eyes:      General: No scleral icterus  Neck:      Thyroid: No thyromegaly  Cardiovascular:      Rate and Rhythm: Normal rate and regular rhythm  Heart sounds: No murmur heard  Pulmonary:      Effort: Pulmonary effort is normal  No respiratory distress  Breath sounds: Normal breath sounds  Musculoskeletal:      Cervical back: Normal range of motion  Right lower leg: No edema  Left lower leg: No edema  Lymphadenopathy:      Cervical: No cervical adenopathy  Skin:     General: Skin is warm and dry  Neurological:      General: No focal deficit present  Mental Status: She is alert and oriented to person, place, and time  Psychiatric:         Mood and Affect: Mood normal          Behavior: Behavior normal          Thought Content:  Thought content normal          Judgment: Judgment normal

## 2022-01-14 NOTE — TELEPHONE ENCOUNTER
Spoke to pt this morning, she states she started with palpitations and contacted her ob, they recommended she get seen urgently rather than wait for a pcr test  Pt cancelled appt for this afternoon, did not want to schedule appt for Monday with you, and said she will go to urgent care

## 2022-01-18 ENCOUNTER — HOSPITAL ENCOUNTER (OUTPATIENT)
Dept: NON INVASIVE DIAGNOSTICS | Age: 27
Discharge: HOME | End: 2022-01-18
Payer: COMMERCIAL

## 2022-01-18 DIAGNOSIS — R00.2 PALPITATIONS: ICD-10-CM

## 2022-01-18 PROCEDURE — 93225 XTRNL ECG REC<48 HRS REC: CPT

## 2022-01-18 PROCEDURE — 93226 XTRNL ECG REC<48 HR SCAN A/R: CPT

## 2022-01-20 ENCOUNTER — TELEPHONE (OUTPATIENT)
Dept: FAMILY MEDICINE CLINIC | Facility: HOSPITAL | Age: 27
End: 2022-01-20

## 2022-01-20 NOTE — TELEPHONE ENCOUNTER
I agree she should be calling OB for pregnancy safe bowel recommendations  Increasing water intake and high fiber foods will help also

## 2022-01-20 NOTE — TELEPHONE ENCOUNTER
Patient having constipation issues  She is pregnant  I asked her to call her OB/GYN, but she wants a call from you

## 2022-01-21 PROCEDURE — 93227 XTRNL ECG REC<48 HR R&I: CPT | Performed by: INTERNAL MEDICINE

## 2022-03-28 ENCOUNTER — ROUTINE PRENATAL (OUTPATIENT)
Dept: PERINATAL CARE | Facility: OTHER | Age: 27
End: 2022-03-28
Payer: COMMERCIAL

## 2022-03-28 VITALS
SYSTOLIC BLOOD PRESSURE: 133 MMHG | BODY MASS INDEX: 29.88 KG/M2 | HEART RATE: 90 BPM | WEIGHT: 175 LBS | HEIGHT: 64 IN | DIASTOLIC BLOOD PRESSURE: 76 MMHG

## 2022-03-28 DIAGNOSIS — Z36.86 ENCOUNTER FOR ANTENATAL SCREENING FOR CERVICAL LENGTH: ICD-10-CM

## 2022-03-28 DIAGNOSIS — Z36.3 ENCOUNTER FOR ANTENATAL SCREENING FOR MALFORMATIONS: ICD-10-CM

## 2022-03-28 DIAGNOSIS — O09.899 SUPERVISION OF OTHER HIGH RISK PREGNANCIES, UNSPECIFIED TRIMESTER: ICD-10-CM

## 2022-03-28 DIAGNOSIS — O35.8XX0 PYELECTASIS OF FETUS ON PRENATAL ULTRASOUND: Primary | ICD-10-CM

## 2022-03-28 PROCEDURE — 76817 TRANSVAGINAL US OBSTETRIC: CPT | Performed by: OBSTETRICS & GYNECOLOGY

## 2022-03-28 PROCEDURE — 76811 OB US DETAILED SNGL FETUS: CPT | Performed by: OBSTETRICS & GYNECOLOGY

## 2022-03-28 PROCEDURE — 99241 PR OFFICE CONSULTATION NEW/ESTAB PATIENT 15 MIN: CPT | Performed by: OBSTETRICS & GYNECOLOGY

## 2022-03-28 NOTE — PROGRESS NOTES
[address] [facility]: Ms Irina Mackey was seen today for anatomic survey and cervical length screening ultrasound  See ultrasound report under "OB Procedures" tab  Review of Systems   Constitutional: Negative for chills, fever and unexpected weight change  HENT: Negative for congestion, dental problem, facial swelling and sore throat  Eyes: Negative for visual disturbance  Respiratory: Negative for cough and shortness of breath  Cardiovascular: Negative for chest pain and palpitations  Gastrointestinal: Negative for diarrhea and vomiting  Endocrine: Negative for polydipsia  Genitourinary: Negative for dysuria and vaginal bleeding  Musculoskeletal: Negative for back pain and joint swelling  Skin: Negative for rash and wound  Allergic/Immunologic: Negative for immunocompromised state  Neurological: Negative for seizures and headaches  Hematological: Does not bruise/bleed easily  Psychiatric/Behavioral: Negative for hallucinations and suicidal ideas  Physical Exam  Constitutional:       General: She is not in acute distress  Appearance: Normal appearance  She is not ill-appearing, toxic-appearing or diaphoretic  HENT:      Head: Normocephalic and atraumatic  Nose: No congestion or rhinorrhea  Eyes:      General: No scleral icterus  Right eye: No discharge  Left eye: No discharge  Extraocular Movements: Extraocular movements intact  Conjunctiva/sclera: Conjunctivae normal    Pulmonary:      Effort: Pulmonary effort is normal  No respiratory distress  Abdominal:      Tenderness: There is no abdominal tenderness  Musculoskeletal:      Cervical back: Normal range of motion  Skin:     Coloration: Skin is not jaundiced or pale  Findings: No erythema, lesion or rash  Neurological:      General: No focal deficit present  Mental Status: She is alert and oriented to person, place, and time     Psychiatric:         Mood and Affect: Mood normal          Behavior: Behavior normal          Please don't hesitate to contact our office with any concerns or questions    Agustín Vyas MD

## 2022-03-28 NOTE — PATIENT INSTRUCTIONS
Thank you for choosing us for your  care today  If you have any questions about your ultrasound or care, please do not hesitate to contact us or your primary obstetrician  Some general instructions for your pregnancy are:     Protect against coronavirus: get vaccinated - pregnant women are increased risk of severe COVID  Notify your primary care doctor if you have any symptoms   Exercise: Aim for 22 minutes per day (150 minutes per week) of regular exercise  Walking is great!  Nutrition: aim for calcium-rich and iron-rich foods as well as healthy sources of protein   Learn about Preeclampsia: preeclampsia is a common, serious high blood pressure complication in pregnancy  A blood pressure of 559CQNY (systolic or top number) or 53ADQN (diastolic or bottom number) is not normal and needs evaluation by your doctor  Aspirin is sometimes prescribed in early pregnancy to prevent preeclampsia in women with risk factors - ask your obstetrician if you should be on this medication   If you smoke, try to reduce how many cigarettes you smoke or try to quit completely  Do not vape   Other warning signs to watch out for in pregnancy or postpartum: chest pain, obstructed breathing or shortness of breath, seizures, thoughts of hurting yourself or your baby, bleeding, a painful or swollen leg, fever, or headache (see AWHONN POST-BIRTH Warning Signs campaign)  If these happen call 911  Itching is also not normal in pregnancy and if you experience this, especially over your hands and feet, potentially worse at night, notify your doctors

## 2022-03-28 NOTE — PROGRESS NOTES
Ultrasound Probe Disinfection    A transvaginal ultrasound was performed  Prior to use, disinfection was performed with High Level Disinfection Process (Trophon)  Probe serial number [id number]: [id number] was used        Crystal Anne  03/28/22  11:32 AM

## 2022-03-28 NOTE — LETTER
2022    Michelle Plane  [address]  [address]    Patient: Erlinda Weston   YOB: 1995   Date of Visit: 3/28/2022   Gestational age Justice Sleight of this communication: Routine       Dear Agatha Ruiz,    This patient was seen recently in our  office  Consultation is contained in body of ultrasound report which has been faxed to you under separate cover; please contact us if you do not receive this  Please don't hesitate to contact our office with any concerns or questions       Sincerely,      Latanya Zimmerman MD  Attending Physician, Divya

## 2022-04-18 ENCOUNTER — TELEPHONE (OUTPATIENT)
Dept: PERINATAL CARE | Facility: CLINIC | Age: 27
End: 2022-04-18

## 2022-04-18 NOTE — TELEPHONE ENCOUNTER
Left patient a message that her MFM appointment had to be rescheduled  The new time, date and location were provided - 5/27/22  1:30  [facility]  The patient has been instructed to please call us back at [phone] with any questions or concerns

## 2022-05-16 ENCOUNTER — ULTRASOUND (OUTPATIENT)
Dept: PERINATAL CARE | Facility: OTHER | Age: 27
End: 2022-05-16
Payer: COMMERCIAL

## 2022-05-16 VITALS
WEIGHT: 181.8 LBS | DIASTOLIC BLOOD PRESSURE: 67 MMHG | HEART RATE: 81 BPM | BODY MASS INDEX: 31.04 KG/M2 | SYSTOLIC BLOOD PRESSURE: 137 MMHG | HEIGHT: 64 IN

## 2022-05-16 DIAGNOSIS — Z3A.27 27 WEEKS GESTATION OF PREGNANCY: ICD-10-CM

## 2022-05-16 DIAGNOSIS — O35.8XX0 ENCOUNTER FOR REPEAT ULTRASOUND OF FETAL PYELECTASIS, ANTEPARTUM, SINGLE OR UNSPECIFIED FETUS: Primary | ICD-10-CM

## 2022-05-16 PROCEDURE — 99213 OFFICE O/P EST LOW 20 MIN: CPT | Performed by: OBSTETRICS & GYNECOLOGY

## 2022-05-16 PROCEDURE — 76816 OB US FOLLOW-UP PER FETUS: CPT | Performed by: OBSTETRICS & GYNECOLOGY

## 2022-05-16 PROCEDURE — 3008F BODY MASS INDEX DOCD: CPT | Performed by: OBSTETRICS & GYNECOLOGY

## 2022-05-16 NOTE — PROGRESS NOTES
Please refer to the [facility] ultrasound report in Ob Procedures for additional information regarding today's visit

## 2022-07-18 ENCOUNTER — ULTRASOUND (OUTPATIENT)
Dept: PERINATAL CARE | Facility: OTHER | Age: 27
End: 2022-07-18
Payer: COMMERCIAL

## 2022-07-18 VITALS
WEIGHT: 192 LBS | BODY MASS INDEX: 32.78 KG/M2 | HEART RATE: 79 BPM | DIASTOLIC BLOOD PRESSURE: 66 MMHG | HEIGHT: 64 IN | SYSTOLIC BLOOD PRESSURE: 116 MMHG

## 2022-07-18 DIAGNOSIS — O09.893 ENCOUNTER FOR SUPERVISION OF HIGH RISK PREGNANCY DUE TO FETAL ANOMALY, THIRD TRIMESTER: ICD-10-CM

## 2022-07-18 DIAGNOSIS — Z3A.36 36 WEEKS GESTATION OF PREGNANCY: Primary | ICD-10-CM

## 2022-07-18 PROBLEM — N28.89 PELVIECTASIS: Status: ACTIVE | Noted: 2022-07-18

## 2022-07-18 PROBLEM — N28.89 PELVIECTASIS: Status: RESOLVED | Noted: 2022-07-18 | Resolved: 2022-07-18

## 2022-07-18 PROCEDURE — 76816 OB US FOLLOW-UP PER FETUS: CPT | Performed by: OBSTETRICS & GYNECOLOGY

## 2022-07-18 PROCEDURE — 99213 OFFICE O/P EST LOW 20 MIN: CPT | Performed by: OBSTETRICS & GYNECOLOGY

## 2022-07-18 NOTE — LETTER
July 18, 2022     Sche[address] Cane  [address]  [address]    Patient: Anamika Estrada   YOB: 1995   Date of Visit: 7/18/2022       Dear Dr Klever Yusuf: Thank you for referring Early  to me for evaluation  Below are my notes for this consultation  If you have questions, please do not hesitate to call me  I look forward to following your patient along with you  Sincerely,        Wandalee Dancer, MD        CC: No Recipients  Wandalee Dancer, MD  7/18/2022  4:13 PM  Sign when Signing Visit  A fetal ultrasound was completed  See Ob procedures in Epic for an interpretation and recommendations  Do not hesitate to contact us in [facility] if you have questions  Ted Mendoza MD, [address] [facility]  Maternal Fetal Medicine

## 2022-07-18 NOTE — PROGRESS NOTES
A fetal ultrasound was completed  See Ob procedures in Epic for an interpretation and recommendations  Do not hesitate to contact us in [facility] if you have questions  Beba Saunders MD, [address] [facility]  Maternal Fetal Medicine

## 2023-01-03 ENCOUNTER — OFFICE VISIT (OUTPATIENT)
Dept: FAMILY MEDICINE CLINIC | Facility: HOSPITAL | Age: 28
End: 2023-01-03

## 2023-01-03 VITALS
BODY MASS INDEX: 29.08 KG/M2 | WEIGHT: 169.4 LBS | DIASTOLIC BLOOD PRESSURE: 64 MMHG | HEART RATE: 98 BPM | SYSTOLIC BLOOD PRESSURE: 118 MMHG | TEMPERATURE: 98.9 F | OXYGEN SATURATION: 99 %

## 2023-01-03 DIAGNOSIS — J02.9 PHARYNGITIS, UNSPECIFIED ETIOLOGY: Primary | ICD-10-CM

## 2023-01-03 PROBLEM — O09.893 ENCOUNTER FOR SUPERVISION OF HIGH RISK PREGNANCY DUE TO FETAL ANOMALY, THIRD TRIMESTER: Status: RESOLVED | Noted: 2022-07-18 | Resolved: 2023-01-03

## 2023-01-03 PROBLEM — Z3A.36 36 WEEKS GESTATION OF PREGNANCY: Status: RESOLVED | Noted: 2022-07-18 | Resolved: 2023-01-03

## 2023-01-03 LAB — S PYO AG THROAT QL: NEGATIVE

## 2023-01-03 NOTE — PROGRESS NOTES
Name: Ester Nguyen      : 1995      MRN: [id number]  Encounter Provider: IMANI Benito  Encounter Date: 1/3/2023   Encounter department: [facility] [address]  Pharyngitis, unspecified etiology  -     POCT rapid strepA  -     Throat culture; Future  -     Covid/Flu- Office Collect  -     Throat culture    likely viral pharyngitis - r/o strep w/culture obtained (in office rapid is negative)  R/O covid & flu w/swab obtained  Continue supportive measures w/OTC meds prn (reviewed safe breastfeeding options), rest, push fluids & diet as tolerated       Subjective        Hasn't felt well since 2 nights ago  Has sore swollen glands and white sore in left side of throat  Did a negative covid test this morning  Breastfeeding 11 month old daughter  Review of Systems   Constitutional: Positive for fatigue and fever (100 5-101 since yesterday)  HENT: Positive for sore throat  Negative for congestion and rhinorrhea  Respiratory: Negative for cough  Musculoskeletal: Positive for myalgias  Neurological: Positive for headaches (2 evenings ago)         Current Outpatient Medications on File Prior to Visit   Medication Sig   • [DISCONTINUED] azelastine (ASTELIN) 0 1 % nasal spray USE 2 SPRAYS IN EACH NOSTRIL EVERY MORNING (Patient not taking: Use 2 sprays in each nostril every morning)   • [DISCONTINUED] doxylamine (UNISON) 25 MG tablet Take 12 5 mg by mouth daily at bedtime (Patient not taking: Reported on 3/28/2022)   • [DISCONTINUED] fluticasone (FLONASE) 50 mcg/act nasal spray 1 spray into each nostril daily (Patient not taking: Reported on 3/28/2022)   • [DISCONTINUED] loratadine (CLARITIN) 10 mg tablet Take 10 mg by mouth daily (Patient not taking: Reported on 3/28/2022)   • [DISCONTINUED] Multiple Vitamins-Minerals (MULTIVITAMIN WITH MINERALS) tablet Take 1 tablet by mouth daily (Patient not taking: Reported on 3/28/2022)   • [DISCONTINUED] omeprazole (PriLOSEC) 20 mg delayed release capsule Take 1 capsule (20 mg total) by mouth daily before breakfast (Patient not taking: Reported on 3/28/2022)   • [DISCONTINUED] ondansetron (ZOFRAN) 4 mg tablet Take 1 tablet (4 mg total) by mouth every 8 (eight) hours as needed for nausea or vomiting (Patient not taking: Reported on 3/28/2022)   • [DISCONTINUED] phenazopyridine (PYRIDIUM) 100 mg tablet Take 1 tablet (100 mg total) by mouth 3 (three) times a day as needed for bladder spasms (Patient not taking: Reported on 3/28/2022)   • [DISCONTINUED] Prenatal MV-Min-Fe Fum-FA-DHA (PRENATAL 1 PO) Take by mouth (Patient not taking: Reported on 1/3/2023)   • [DISCONTINUED] pyridoxine (VITAMIN B6) 50 mg tablet Take 50 mg by mouth daily (Patient not taking: Reported on 3/28/2022)   • [DISCONTINUED] triamcinolone (KENALOG) 0 1 % ointment Apply topically 2 (two) times a day (Patient not taking: Reported on 3/28/2022)       Objective     /64   Pulse 98   Temp 98 9 °F (37 2 °C)   Wt 76 8 kg (169 lb 6 4 oz)   SpO2 99%   BMI 29 08 kg/m²       Physical Exam  Vitals reviewed  Constitutional:       General: She is not in acute distress  Appearance: She is well-developed  HENT:      Head: Normocephalic  Nose: Congestion present  Mouth/Throat:      Mouth: Mucous membranes are moist  Oral lesions (single ulcer left tonsil) present  Pharynx: Posterior oropharyngeal erythema present  No oropharyngeal exudate  Tonsils: No tonsillar exudate  1+ on the right  1+ on the left  Cardiovascular:      Rate and Rhythm: Normal rate and regular rhythm  Heart sounds: No murmur heard  Pulmonary:      Effort: Pulmonary effort is normal  No respiratory distress  Breath sounds: Normal breath sounds  Comments: No cough  Musculoskeletal:      Cervical back: Normal range of motion  Lymphadenopathy:      Cervical: Cervical adenopathy present        Right cervical: Posterior cervical adenopathy present  Left cervical: Posterior cervical adenopathy present  Skin:     General: Skin is warm and dry  Neurological:      General: No focal deficit present  Mental Status: She is alert and oriented to person, place, and time     Psychiatric:         Mood and Affect: Mood normal           IMANI Powers

## 2023-01-04 LAB
FLUAV RNA RESP QL NAA+PROBE: NEGATIVE
FLUBV RNA RESP QL NAA+PROBE: NEGATIVE
SARS-COV-2 RNA RESP QL NAA+PROBE: NEGATIVE

## 2023-02-09 ENCOUNTER — TELEPHONE (OUTPATIENT)
Dept: FAMILY MEDICINE CLINIC | Facility: HOSPITAL | Age: 28
End: 2023-02-09

## 2023-02-09 NOTE — TELEPHONE ENCOUNTER
FREDRICK CALLED ASKING TO SPEAK W/ MONICA - SHE HAS A TERRIBLE HA - SHE TOOK EXCEDRIN BUT ASKING IF SHE CAN TAKE SOMETHING ELSE EVEN THOUGH IT HASN'T BEEN THAT LONG SINCE SHE TOOK THE [address]  [phone]

## 2023-02-09 NOTE — TELEPHONE ENCOUNTER
Patient has a bad headache and unfortunately cannot leave work  Took 2 Excedrin around 7:30 this morning, which according to the label is the max dose in 24 hours  Wants to know if there is anything else she can take on top of this to help alleviate the symptoms more

## 2023-12-15 ENCOUNTER — TELEPHONE (OUTPATIENT)
Dept: OBGYN CLINIC | Facility: CLINIC | Age: 28
End: 2023-12-15

## 2023-12-15 NOTE — TELEPHONE ENCOUNTER
12/15/2023  called patient and lm letting her know that we would need to change her appointment from 1/10-1/25 on Dr Jiménez schedule because Dr. Jiménez will be out of the office

## 2024-01-22 PROBLEM — Z91.89 AT HIGH RISK FOR BREAST CANCER: Status: ACTIVE | Noted: 2024-01-22

## 2024-01-22 NOTE — PATIENT INSTRUCTIONS
- Maintain healthy weight with BMI ideally between 18-25.    - Eat a healthy diet, including multiple servings of vegetables and fruits, as well as lean protein sources.  - Get at least 150 minutes of moderate aerobic activity or 75 minutes of vigorous aerobic activity a week, or a combination of moderate and vigorous activity.  Greater amounts of exercise will provide an even greater health benefit.   - Ensure diet provides 1200mg of Calcium daily (divided) and 800IU of vitamin D, or take supplements to meet this.  - Safe sex practices recommended.    - Resources - information on birth control and sexually transmitted infections - www.bedsider.org            - information on sexually transmitted infections - www.cdc.gov/std/

## 2024-01-24 ENCOUNTER — OFFICE VISIT (OUTPATIENT)
Dept: OBGYN CLINIC | Facility: CLINIC | Age: 29
End: 2024-01-24
Payer: COMMERCIAL

## 2024-01-24 VITALS
HEIGHT: 64 IN | BODY MASS INDEX: 28.79 KG/M2 | SYSTOLIC BLOOD PRESSURE: 118 MMHG | DIASTOLIC BLOOD PRESSURE: 64 MMHG | WEIGHT: 168.6 LBS

## 2024-01-24 DIAGNOSIS — Z91.89 AT HIGH RISK FOR BREAST CANCER: ICD-10-CM

## 2024-01-24 DIAGNOSIS — Z01.419 ENCOUNTER FOR ANNUAL ROUTINE GYNECOLOGICAL EXAMINATION: Primary | ICD-10-CM

## 2024-01-24 DIAGNOSIS — Z12.4 CERVICAL CANCER SCREENING: ICD-10-CM

## 2024-01-24 PROCEDURE — S0610 ANNUAL GYNECOLOGICAL EXAMINA: HCPCS | Performed by: OBSTETRICS & GYNECOLOGY

## 2024-01-24 NOTE — PROGRESS NOTES
"Annual Wellness Visit  [facility] [place]  {[place] Locations:[address]}    ASSESSMENT/PLAN: Greer Lu is a 29 y.o.  who presents for annual gynecologic exam.    Encounter for routine gynecologic examination  - Routine well woman exam completed today.  - HPV Vaccination status: {hpvimm:09670::\"Immunization series complete\"}  - Plan routine Cervical Cancer screening starting at age 20yo.  - STI screening offered including HIV testing: {stitestin}  - Contraceptive counseling discussed.  Current contraception: {Contraception:97049}  - The following were reviewed in today's visit: {Gyn counselin}    Additional problems addressed during this visit:  {There are no diagnoses linked to this encounter. (Refresh or delete this SmartLink)}    Next visit: ***      CC:  Annual Gynecologic Examination    HPI: Greer Lu is a 29 y.o.  who presents for annual gynecologic examination.  HPI    Gyn History  Patient's last menstrual period was 2023 (exact date).       She  reports being sexually active and has had partner(s) who are male. She reports using the following method of birth control/protection: None.       OB History      Past Medical History:  No date: GERD (gastroesophageal reflux disease)      Comment:  [place]..Mely10/12/15   No date: Kidney stone  No date: Lactose intolerance      Comment:  LA..Mely16     Past Surgical History:  No date: TONSILLECTOMY  2012: TOOTH EXTRACTION     Family History   Problem Relation Age of Onset    Diabetes Father     Crohn's disease Sister     Breast cancer Paternal Grandmother         over age 50    Thyroid cancer Paternal Grandmother         over age 50    Diabetes Paternal Grandfather     Uterine cancer Neg Hx     Ovarian cancer Neg Hx     Colon cancer Neg Hx         Social History     Tobacco Use    Smoking status: Former     Current packs/day: 0.00     Types: Cigarettes    Smokeless tobacco: Never   Vaping Use    Vaping status: Never " "Used   Substance Use Topics    Alcohol use: Not Currently     Comment: social    Drug use: No        No current outpatient medications on file.    She is allergic to amoxicillin..    ROS negative except as noted in HPI    Objective:  /64   Ht 5' 3.75\" (1.619 m)   Wt 76.5 kg (168 lb 9.6 oz)   LMP 12/26/2023 (Exact Date)   Breastfeeding No   BMI 29.17 kg/m²      Physical Exam    "

## 2024-01-24 NOTE — ASSESSMENT & PLAN NOTE
Pt with Tyrer-Cuzick score of 22% risk in 10 years.    Follows with [facility] high risk breast cancer clinic - last visit 10/2023. They recom MRI annually and exam every 6 months.  Last MRI 2 years ago per pt due to pregnancy.  Encouraged her to call Center to see about getting MRI prior to  next pregnancy.

## 2024-01-24 NOTE — PROGRESS NOTES
Annual Wellness Visit  [facility] - [address], [address], [place] [address]    ASSESSMENT/PLAN: Greer Lu is a 29 y.o.  who presents for annual gynecologic exam.    Encounter for routine gynecologic examination  - Routine well woman exam completed today.  - Cervical Cancer Screening: Current ASCCP Guidelines reviewed. Last Pap: 2018 normal. Next Pap Due: today, routine.  - HPV Vaccination status: Immunization series complete  - STI screening offered including HIV testing: offered, pt declined  - Contraceptive counseling discussed.  Current contraception: withdrawal  - The following were reviewed in today's visit: family planning choices, exercise, and healthy diet    Additional problems addressed during this visit:  1. Encounter for annual routine gynecological examination  Comments:  start Prenatal vitamin a couple months prior to trying to conceive.    2. Cervical cancer screening  -     Thinprep Tis Pap Reflex HPV mRNA E6/E7    3. At high risk for breast cancer  Assessment & Plan:  Pt with Tyrer-Cuzick score of 22% risk in 10 years.    Follows with [facility] high risk breast cancer clinic - last visit 10/2023. They recom MRI annually and exam every 6 months.  Last MRI 2 years ago per pt due to pregnancy.  Encouraged her to call Center to see about getting MRI prior to  next pregnancy.          Next visit: 1 year Wellness      CC:  Annual Gynecologic Examination    HPI: Greer Lu is a 29 y.o.  who presents for annual gynecologic examination.  New Patient Wellness visit today.  She denies any breast, urinary or pelvic issues at today's visit.    Regular periods.  Monthly.  No issues.  Sexually active, withdrawal method and calendar method.  No new partners.  Planning to try conceiving in next year.  No issues conceiving previously.    Some hemorrhoids - uses topical creams prn.    Last pap about 2 years.  No abnormal.  Gardasil completed.        Gyn  "History  Patient's last menstrual period was 2023 (exact date).     Last Pap: 2018 was normal    She  reports being sexually active and has had partner(s) who are male. She reports using the following method of birth control/protection: Coitus interruptus.       OB History      Past Medical History:  No date: GERD (gastroesophageal reflux disease)  2018: Kidney stone     Past Surgical History:  2017: TONSILLECTOMY  2012: TOOTH EXTRACTION     Family History   Problem Relation Age of Onset    Diabetes Father     Crohn's disease Sister     Breast cancer Paternal Grandmother         over age 50    Thyroid cancer Paternal Grandmother         over age 50    Diabetes Paternal Grandfather     Uterine cancer Neg Hx     Ovarian cancer Neg Hx     Colon cancer Neg Hx         Social History     Tobacco Use    Smoking status: Former     Current packs/day: 0.00     Types: Cigarettes    Smokeless tobacco: Never   Vaping Use    Vaping status: Never Used   Substance Use Topics    Alcohol use: Yes     Comment: social    Drug use: No        No current outpatient medications on file.    She is allergic to amoxicillin..    ROS negative except as noted in HPI    Objective:  /64   Ht 5' 3.75\" (1.619 m)   Wt 76.5 kg (168 lb 9.6 oz)   LMP 2023 (Exact Date)   Breastfeeding No   BMI 29.17 kg/m²      Physical Exam  Constitutional:       Appearance: Normal appearance.   Chest:   Breasts:     Right: Normal. No mass or tenderness.      Left: Normal. No mass or tenderness.   Abdominal:      Palpations: Abdomen is soft.      Tenderness: There is no abdominal tenderness.   Genitourinary:     General: Normal vulva.      Vagina: No bleeding or lesions.      Cervix: Normal.      Uterus: Normal. Not tender.       Adnexa:         Right: No mass or tenderness.          Left: No mass or tenderness.        Rectum: No external hemorrhoid.   Musculoskeletal:         General: Normal range of motion.   Lymphadenopathy:      " Upper Body:      Right upper body: No axillary adenopathy.      Left upper body: No axillary adenopathy.   Neurological:      Mental Status: She is alert and oriented to person, place, and time.   Psychiatric:         Mood and Affect: Mood normal.         Behavior: Behavior normal.

## 2024-01-30 LAB
CLINICAL INFO: NORMAL
CYTO CVX: NORMAL
CYTOLOGY CMNT CVX/VAG CYTO-IMP: NORMAL
DATE PREVIOUS BX: NORMAL
LMP START DATE: NORMAL
SL AMB PREV. PAP:: NORMAL
SPECIMEN SOURCE CVX/VAG CYTO: NORMAL

## 2024-02-08 ENCOUNTER — OFFICE VISIT (OUTPATIENT)
Dept: URGENT CARE | Facility: CLINIC | Age: 29
End: 2024-02-08
Payer: COMMERCIAL

## 2024-02-08 VITALS
HEART RATE: 73 BPM | SYSTOLIC BLOOD PRESSURE: 125 MMHG | RESPIRATION RATE: 16 BRPM | TEMPERATURE: 98.2 F | OXYGEN SATURATION: 98 % | DIASTOLIC BLOOD PRESSURE: 72 MMHG

## 2024-02-08 DIAGNOSIS — J06.9 VIRAL URI WITH COUGH: Primary | ICD-10-CM

## 2024-02-08 DIAGNOSIS — H10.31 ACUTE CONJUNCTIVITIS OF RIGHT EYE, UNSPECIFIED ACUTE CONJUNCTIVITIS TYPE: ICD-10-CM

## 2024-02-08 PROCEDURE — 99213 OFFICE O/P EST LOW 20 MIN: CPT

## 2024-02-08 RX ORDER — OFLOXACIN 3 MG/ML
1 SOLUTION/ DROPS OPHTHALMIC 4 TIMES DAILY
Qty: 5 ML | Refills: 0 | Status: SHIPPED | OUTPATIENT
Start: 2024-02-08

## 2024-02-08 NOTE — PROGRESS NOTES
[facility]        NAME: Greer Lu is a 29 y.o. female  : 1995    MRN: [id number]  DATE: 2024  TIME: 9:01 AM    Assessment and Plan   Viral URI with cough [J06.9]  1. Viral URI with cough        2. Acute conjunctivitis of right eye, unspecified acute conjunctivitis type  ofloxacin (OCUFLOX) 0.3 % ophthalmic solution            Patient Instructions     Ofloxacin prescribed - use as directed for 7 days.    Airborne for extra vitamin C and zinc.   Mucinex DM and Sudafed for congestion.  Robitussin and Delsym for cough.    You can take ibuprofen/Motrin or acetaminophen/Tylenol as needed for pain or fever.    Apply warm compresses to eye(s) as needed for discomfort.    Do not rub your eyes. Wash your hands A LOT.    Follow up with your PCP in the next 3-5 days if no improvement.     Go to the ED if symptoms severely worsen.       Chief Complaint     Chief Complaint   Patient presents with    Eye Pain     Pt reports right eye pain, wateriness, redness, swelling x1 day.          History of Present Illness       29-year-old female presenting with right eye discomfort x 1 day.  Patient states she has had URI symptoms of nasal congestion, rhinorrhea, sinus pressure, and a dry scratchy throat x 4 to 5 days.  Yesterday she noticed her right eye was red and there was a lot of watery drainage.  Woke up this morning and the eye was crusted shut.  Some blurred vision she attributes to excessive tearing.  Denies known fevers, headaches, dizziness/lightheadedness, eye pain, sensitivity to light, floaters/halos, and vision loss.        Review of Systems   Review of Systems   Constitutional:  Negative for chills and fever.   HENT:  Positive for congestion, ear pain (pressure), rhinorrhea, sinus pressure and sore throat (dry/scratchy).    Eyes:  Positive for discharge, redness and visual disturbance (blurred). Negative for photophobia, pain and itching.   Respiratory:  Positive for cough. Negative for  chest tightness, shortness of breath and wheezing.    Cardiovascular:  Negative for chest pain and palpitations.   Gastrointestinal:  Negative for abdominal pain, diarrhea, nausea and vomiting.   Skin:  Negative for pallor and rash.   Neurological:  Negative for dizziness, weakness, light-headedness and headaches.         Current Medications       Current Outpatient Medications:     ofloxacin (OCUFLOX) 0.3 % ophthalmic solution, Administer 1 drop to the right eye 4 (four) times a day, Disp: 5 mL, Rfl: 0    Current Allergies     Allergies as of 02/08/2024 - Reviewed 02/08/2024   Allergen Reaction Noted    Amoxicillin Rash 07/06/2020            The following portions of the patient's history were reviewed and updated as appropriate: allergies, current medications, past family history, past medical history, past social history, past surgical history and problem list.     Past Medical History:   Diagnosis Date    GERD (gastroesophageal reflux disease)     Kidney stone 2018       Past Surgical History:   Procedure Laterality Date    TONSILLECTOMY  2017    TOOTH EXTRACTION  2012       Family History   Problem Relation Age of Onset    Diabetes Father     Crohn's disease Sister     Breast cancer Paternal Grandmother         over age 50    Thyroid cancer Paternal Grandmother         over age 50    Diabetes Paternal Grandfather     Uterine cancer Neg Hx     Ovarian cancer Neg Hx     Colon cancer Neg Hx          Medications have been verified.        Objective   /72   Pulse 73   Temp 98.2 °F (36.8 °C)   Resp 16   LMP 12/26/2023 (Exact Date)   SpO2 98%        Physical Exam     Physical Exam  Vitals and nursing note reviewed.   Constitutional:       General: She is not in acute distress.     Appearance: She is not ill-appearing or diaphoretic.   HENT:      Head: Normocephalic.      Right Ear: Tympanic membrane, ear canal and external ear normal.      Left Ear: Tympanic membrane, ear canal and external ear normal.       Nose: Congestion present.      Mouth/Throat:      Mouth: Mucous membranes are moist.      Pharynx: Oropharynx is clear. Posterior oropharyngeal erythema (mild) present. No oropharyngeal exudate.   Eyes:      General:         Right eye: Discharge present.      Extraocular Movements: Extraocular movements intact.      Pupils: Pupils are equal, round, and reactive to light.      Comments: Right upper eyelid is slightly swollen and erythematous. Erythema to right conjunctiva. Watery drainage present with crusting to upper and lower eyelid margins. EOM not painful. NO periorbital swelling, erythema, or tenderness.   Cardiovascular:      Rate and Rhythm: Normal rate and regular rhythm.      Pulses: Normal pulses.      Heart sounds: Normal heart sounds.   Pulmonary:      Effort: Pulmonary effort is normal.      Breath sounds: Normal breath sounds.   Musculoskeletal:         General: Normal range of motion.      Cervical back: Normal range of motion and neck supple.   Lymphadenopathy:      Cervical: No cervical adenopathy.   Skin:     General: Skin is warm and dry.      Capillary Refill: Capillary refill takes less than 2 seconds.   Neurological:      Mental Status: She is alert and oriented to person, place, and time.

## 2024-02-08 NOTE — PATIENT INSTRUCTIONS
Ofloxacin prescribed - use as directed for 7 days.    Airborne for extra vitamin C and zinc.   Mucinex DM and Sudafed for congestion.  Robitussin and Delsym for cough.    You can take ibuprofen/Motrin or acetaminophen/Tylenol as needed for pain or fever.    Apply warm compresses to eye(s) as needed for discomfort.    Do not rub your eyes. Wash your hands A LOT.    Follow up with your PCP in the next 3-5 days if no improvement.     Go to the ED if symptoms severely worsen.

## 2024-02-10 DIAGNOSIS — J01.00 ACUTE NON-RECURRENT MAXILLARY SINUSITIS: Primary | ICD-10-CM

## 2024-02-10 RX ORDER — DOXYCYCLINE HYCLATE 100 MG/1
100 CAPSULE ORAL EVERY 12 HOURS SCHEDULED
Qty: 14 CAPSULE | Refills: 0 | Status: SHIPPED | OUTPATIENT
Start: 2024-02-10 | End: 2024-02-17

## 2024-06-05 ENCOUNTER — TELEPHONE (OUTPATIENT)
Dept: FAMILY MEDICINE CLINIC | Facility: HOSPITAL | Age: 29
End: 2024-06-05

## 2024-06-28 ENCOUNTER — NURSE TRIAGE (OUTPATIENT)
Age: 29
End: 2024-06-28

## 2024-06-28 NOTE — TELEPHONE ENCOUNTER
"G2 LMP 5/29 , 4 weeks 2 days gestation  Started w/ mild cramping 3 days ago, started with dark brown spotting this am, wearing panty liner and notices when she wipes.   Denies fever or any urinary symptoms.  Denies recent intercourse. Patient reports her blood type is A+ ( not on file)  Reviewed to continue to monitor, call back with increase in bleeding, passing clots or sever cramping. Advised patient will review with on call provider and call back if any other recommendations. Patient verbalized understanding and thankful.  ESC sent to Dr. Levon Escobedo- agreed.    Reason for Disposition   SPOTTING (single or brief episode)    Answer Assessment - Initial Assessment Questions  1. ONSET: \"When did this bleeding start?\"        Yesterday   2. DESCRIPTION: \"Describe the bleeding that you are having.\" \"How much bleeding is there?\"     - SPOTTING: spotting, or pinkish / brownish mucous discharge; does not fill panti-liner or pad     - MILD:  less than 1 pad / hour; less than patient's usual menstrual bleeding    - MODERATE: 1-2 pads / hour; 1 menstrual cup every 6 hours; small-medium blood clots (e.g., pea, grape, small coin)    - SEVERE: soaking 2 or more pads/hour for 2 or more hours; 1 menstrual cup every 2 hours; bleeding not contained by pads or continuous red blood from vagina; large blood clots (e.g., golf ball, large coin)       Small amount of brownish spotting, wearing liner. Notices when she wipes  3. ABDOMINAL PAIN SEVERITY: If present, ask: \"How bad is it?\"  (e.g., Scale 1-10; mild, moderate, or severe)    - MILD (1-3): doesn't interfere with normal activities, abdomen soft and not tender to touch     - MODERATE (4-7): interferes with normal activities or awakens from sleep, tender to touch     - SEVERE (8-10): excruciating pain, doubled over, unable to do any normal activities      Mild period cramps come and go  4. PREGNANCY: \"Do you know how many weeks or months pregnant you are?\" \"When was the first " "day of your last normal menstrual period?\"      LMP 5/29/24  5. HEMODYNAMIC STATUS: \"Are you weak or feeling lightheaded?\" If Yes, ask: \"Can you stand and walk normally?\"       Denies   6. OTHER SYMPTOMS: \"What other symptoms are you having with the bleeding?\" (e.g., passed tissue, vaginal discharge, fever, menstrual-type cramps)      denies    Protocols used: Pregnancy - Vaginal Bleeding Less Than 20 Weeks EGA-ADULT-OH    "

## 2024-07-17 ENCOUNTER — TELEPHONE (OUTPATIENT)
Age: 29
End: 2024-07-17

## 2024-07-24 ENCOUNTER — ULTRASOUND (OUTPATIENT)
Dept: OBGYN CLINIC | Facility: CLINIC | Age: 29
End: 2024-07-24
Payer: COMMERCIAL

## 2024-07-24 VITALS
SYSTOLIC BLOOD PRESSURE: 108 MMHG | HEIGHT: 64 IN | DIASTOLIC BLOOD PRESSURE: 58 MMHG | BODY MASS INDEX: 30.22 KG/M2 | WEIGHT: 177 LBS

## 2024-07-24 DIAGNOSIS — Z3A.01 7 WEEKS GESTATION OF PREGNANCY: ICD-10-CM

## 2024-07-24 DIAGNOSIS — Z3A.08 8 WEEKS GESTATION OF PREGNANCY: ICD-10-CM

## 2024-07-24 DIAGNOSIS — N91.2 AMENORRHEA: Primary | ICD-10-CM

## 2024-07-24 LAB
SL AMB  POCT GLUCOSE, UA: NORMAL
SL AMB POCT URINE PROTEIN: NORMAL

## 2024-07-24 PROCEDURE — 76817 TRANSVAGINAL US OBSTETRIC: CPT | Performed by: PHYSICIAN ASSISTANT

## 2024-07-24 PROCEDURE — 99213 OFFICE O/P EST LOW 20 MIN: CPT | Performed by: PHYSICIAN ASSISTANT

## 2024-07-24 PROCEDURE — 81002 URINALYSIS NONAUTO W/O SCOPE: CPT | Performed by: PHYSICIAN ASSISTANT

## 2024-07-24 NOTE — PROGRESS NOTES
"Pregnancy Confirmation Visit  [facility] [place]  [address] [place] [address]    Assessment/Plan:  29 y.o.  presenting with missed menses.  Viable pregnancy 7w5d by ultrasound done today. Menses are irregular every 26-32 days apart.   - Continue/start prenatal vitamin  - We reviewed her current medications and discussed which are safe to continue in pregnancy  - We briefly discussed options for aneuploidy screening, to be discussed further at the prenatal intake, MFM referral given.   - Schedule prenatal intake with RN and initial prenatal visit; prenatal labs will be ordered during the prenatal intake    Additional Pregnancy Problems Addressed today:   1. Amenorrhea  -     AMB US OB < 14 weeks single or first gestation level 1  2. 8 weeks gestation of pregnancy  -     POCT urine dip  3. 7 weeks gestation of pregnancy  -     Ambulatory Referral to Maternal Fetal Medicine; Future; Expected date: 2024        Subjective:    CC: Missed period    Greer Lu is a 29 y.o.  who presents with missed menses.  Patient's last menstrual period was 2024 (approximate)..    Patient notes that this pregnancy was planned and desired.  She was not using contraception at the time of conception. She reports she is certain of her LMP and that she has regular menses, approximately q26-32 days. She has had spotting early on nothing since.     Objective:  /58 (BP Location: Left arm, Patient Position: Sitting, Cuff Size: Standard)   Ht 5' 4\" (1.626 m)   Wt 80.3 kg (177 lb)   LMP 2024 (Approximate)   BMI 30.38 kg/m²     Physical Exam:  General: Well appearing, no distress  CV: Regular rate  Respiratory: Unlabored breathing  Abdomen: Soft, nontender  Extremities: Without edema  Mood and Affect: Appropriate    FIRST TRIMESTER OBSTETRIC ULTRASOUND  Date of study: 2024  Performed by: Radha Thomas PA-C     INDICATION: Amenorrhea, viability    COMPARISON: None.   "   TECHNIQUE:   Transvaginal imaging was performed to assess the gestation, myometrial/endometrial architecture and ovarian parenchymal detail.    The study includes volumetric sweeps and traditional still imaging technique.      FINDINGS:     A single intrauterine gestation is identified.  Cardiac activity is detected at 163.      YOLK SAC:  Present and normal in size and appearance.  MEAN CROWN RUMP LENGTH:  14.5 mm = 7 weeks 5 days   AMNIOTIC FLUID/SAC SHAPE:  Within expected normal range.     UTERUS/ADNEXA:   No adnexal mass or pathologic cyst.  No free fluid identified.     IMPRESSION:    Will assign dating based on ultrasound at today's visit secondary to irregular cycles (26-32 days apart)  Final SURINDER: 3/7/2025  Gestational age: 7w5d  Fetal cardiac activity detected.  No adnexal masses seen.    Radha Thomas PA-C  7/24/2024 11:22 AM       Additional Findings: none     FHR: 163    Assigning a Final SURINDER  Please choose how you are assigning the SURINDER: The LMP known but cycles are irregular 26-32 days apart therefore the final SURINDER will be based on today's ultrasound    Final SURINDER: 3/7/2025 by ultrasound at this encounter.        Radha Thomas PA-C  [facility]  [facility]  [address] [address]  [place] [address]  Dept: [phone]  Dept Fax: [phone]  Loc Appt: [phone]  Loc: [phone]  Loc Fax: [phone]

## 2024-08-06 ENCOUNTER — INITIAL PRENATAL (OUTPATIENT)
Dept: OBGYN CLINIC | Facility: CLINIC | Age: 29
End: 2024-08-06

## 2024-08-06 ENCOUNTER — PATIENT OUTREACH (OUTPATIENT)
Dept: OBGYN CLINIC | Facility: CLINIC | Age: 29
End: 2024-08-06

## 2024-08-06 VITALS
DIASTOLIC BLOOD PRESSURE: 66 MMHG | SYSTOLIC BLOOD PRESSURE: 122 MMHG | HEIGHT: 64 IN | BODY MASS INDEX: 29.94 KG/M2 | WEIGHT: 175.4 LBS

## 2024-08-06 DIAGNOSIS — Z34.81 PRENATAL CARE, SUBSEQUENT PREGNANCY, FIRST TRIMESTER: ICD-10-CM

## 2024-08-06 DIAGNOSIS — Z3A.09 9 WEEKS GESTATION OF PREGNANCY: Primary | ICD-10-CM

## 2024-08-06 PROCEDURE — OBC: Performed by: NURSE PRACTITIONER
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
## 2024-08-06 NOTE — PROGRESS NOTES
OB INTAKE INTERVIEW  Patient is 29 y.o. who presents for OB intake at 9 wks 4 days  She is accompanied by her daughter, Branchland during this encounter  The father of her baby (Lalo Lu) is involved in the pregnancy and is 29 years old.      Last Menstrual Period: 2024  Ultrasound: Measured 7 weeks 5 days on 2025  Estimated Date of Delivery: 3/7/2025 confirmed by US    Signs/Symptoms of Pregnancy  Current pregnancy symptoms: nausea, no vomiting (has wrist relief band), urinary frequency, breast tenderness (decreased now)  Constipation no  Headaches YES (mild tension) occas  Cramping/spotting YES (menstrual type cramping), no vaginal bleeding  PICA cravings no (meat aversion)    Diabetes-  There is no height or weight on file to calculate BMI.  If patient has 1 or more, please order early 1 hour GTT  History of GDM no  BMI >35 no  History of PCOS or current metformin use no  History of LGA/macrosomic infant (4000g/9lbs) no    If patient has 2 or more, please order early 1 hour GTT  BMI>30 YES (BMI = 30.11)  AMA no  First degree relative with type 2 diabetes YES  History of chronic HTN, hyperlipidemia, elevated A1C no  High risk race (, , ,  or ) no    Hypertension- if you answer yes to any of the following, please order baseline preeclampsia labs (cbc, comprehensive metabolic panel, urine protein creatinine ratio, uric acid)  History of of chronic HTN no  History of gestational HTN no  History of preeclampsia, eclampsia, or HELLP syndrome no  History of diabetes no  History of lupus, autoimmune disease, kidney disease no    Thyroid- if yes order TSH with reflex T4  History of thyroid disease no    Bleeding Disorder or Hx of DVT-patient or first degree relative with history of. Order the following if not done previously.   (Factor V, antithrombin III, prothrombin gene mutation, protein C and S Ag, lupus anticoagulant, anticardiolipin, beta-2  glycoprotein)   no    OB/GYN-  History of abnormal pap smear no       Date of last pap smear 2024  History of HPV no  History of Herpes/HSV no  History of other STI (gonorrhea, chlamydia, trich) no  History of prior  YES  History of prior  no  History of  delivery prior to 36 weeks 6 days no  History of blood transfusion no  Ok for blood transfusion YES    Substance screening-   History of tobacco use YES (quit x 7-8 yrs ago)  Currently using tobacco no  Substance Use Screen Level (N/A, LOW, HIGH) N/A    MRSA Screening-   Does the pt have a hx of MRSA? no    Immunizations:  Influenza vaccine given this season NO - recommended in pregnancy  Discussed Tdap vaccine YES  Discussed COVID Vaccine no previous Covid vaccines, no history Covid    Genetic/MFM-  Do you or your partner have a history of any of the following in yourselves or first degree relatives?  Cystic fibrosis no  Spinal muscular atrophy no  Hemoglobinopathy/Sickle Cell/Thalassemia no  Fragile X Intellectual Disability no    If yes, discuss Carrier Screening and recommend consultation with MFM/Genetic Counseling and place specific [facility] Referral for.  Patient previously had carrier screening 2022    If no, discuss Carrier Screening being completed once in a lifetime as a standard of care lab test. Place orders for Cystic Fibrosis Gene Test ([id number]) and Spinal Muscular Atrophy DNA ([id number])      Appointment for Nuchal Translucency Ultrasound at [facility] scheduled for 2025 - discussed NIPT/MSAFP - will verify insurance coverage & decide (previously done but not covered by insur 1st preg)      Interview education  [facility] Pregnancy Essentials Book reviewed, discussed and attached to their AVS YES    Nurse/Family Partnership- patient may qualify; referral placed NO    Prenatal lab work scripts YES ([facility])  Extra labs ordered:  1 hr glucose    Aspirin/Preeclampsia Screen    Risk Level Risk Factor Recommendation   LOW Prior  Uncomplicated full-term delivery no No Aspirin recommendation        MODERATE Nulliparity no Recommend low-dose aspirin if     BMI>30 YES 2 or more moderate risk factors    Family History Preeclampsia (mother/sister) no     35yr old or greater no     Black Race, Concern for SDOH/Low Socioeconomic no     IVF Pregnancy  no     Personal History Risks (low birth weight, prior adverse preg outcome, >10yr preg interval) no         HIGH History of Preeclampsia no Recommend low-dose aspirin if     Multifetal gestation no 1 or more high risk factors    Chronic HTN no     Type 1 or 2 Diabetes no     Renal Disease no     Autoimmune Disease  no      Contraindications to ASA therapy:  NSAID/ ASA allergy: no  Nasal polyps: no  Asthma with history of ASA induced bronchospasm: no  Relative contraindications:  History of GI bleed: no  Active peptic ulcer disease: no  Severe hepatic dysfunction: no    Patient should be recommended to take ASA 162mg during this pregnancy from 12-36wks to lower her risk of preeclampsia: LOW RISK per screening protocol      The patient has a history now or in prior pregnancy notable for:  - 1st pregnancy SAVD @ 38 wk (6 lb 4 oz)        Details that I feel the provider should be aware of:      - previous carrier screening 1/26/2021 - negative CF/SMA, patient (+) [facility] Lemli Opitz -  tested negative  - patient has 1 q month dental cleanings - up to date  - no long distnace travel plans during pregnancy    PN1 visit scheduled. The patient was oriented to our practice, the navigator role, reviewed delivering physicians and [facility] for Delivery. All questions were answered.    Interviewed by: NETO Ansari RN

## 2024-08-08 LAB
EXTERNAL ANTIBODY SCREEN: NORMAL
EXTERNAL HEMATOCRIT: 36.4 %
EXTERNAL HEMOGLOBIN: 12.4 G/DL
EXTERNAL HEPATITIS B SURFACE ANTIGEN: NORMAL
EXTERNAL HIV-1/2 AB-AG: NORMAL
EXTERNAL PLATELET COUNT: 147 K/ÂΜL
EXTERNAL RH FACTOR: POSITIVE
EXTERNAL RUBELLA IGG QUANTITATION: NORMAL

## 2024-08-09 LAB
ABO GROUP BLD: NORMAL
APPEARANCE UR: CLEAR
BACTERIA UR QL AUTO: ABNORMAL /HPF
BASOPHILS # BLD AUTO: 10 CELLS/UL (ref 0–200)
BASOPHILS NFR BLD AUTO: 0.2 %
BILIRUB UR QL STRIP: NEGATIVE
BLD GP AB SCN SERPL QL: NORMAL
COLOR UR: YELLOW
EOSINOPHIL # BLD AUTO: 51 CELLS/UL (ref 15–500)
EOSINOPHIL NFR BLD AUTO: 1 %
ERYTHROCYTE [DISTWIDTH] IN BLOOD BY AUTOMATED COUNT: 12.3 % (ref 11–15)
GLUCOSE 1H P 50 G GLC PO SERPL-MCNC: 83 MG/DL
GLUCOSE UR QL STRIP: NEGATIVE
HBV SURFACE AG SERPL QL IA: NORMAL
HCT VFR BLD AUTO: 36.4 % (ref 35–45)
HCV AB SERPL QL IA: NORMAL
HGB BLD-MCNC: 12.4 G/DL (ref 11.7–15.5)
HGB UR QL STRIP: NEGATIVE
HIV 1+2 AB+HIV1 P24 AG SERPL QL IA: NORMAL
HYALINE CASTS #/AREA URNS LPF: ABNORMAL /LPF
KETONES UR QL STRIP: NEGATIVE
LEUKOCYTE ESTERASE UR QL STRIP: NEGATIVE
LYMPHOCYTES # BLD AUTO: 1285 CELLS/UL (ref 850–3900)
LYMPHOCYTES NFR BLD AUTO: 25.2 %
MCH RBC QN AUTO: 30 PG (ref 27–33)
MCHC RBC AUTO-ENTMCNC: 34.1 G/DL (ref 32–36)
MCV RBC AUTO: 88.1 FL (ref 80–100)
MONOCYTES # BLD AUTO: 291 CELLS/UL (ref 200–950)
MONOCYTES NFR BLD AUTO: 5.7 %
NEUTROPHILS # BLD AUTO: 3463 CELLS/UL (ref 1500–7800)
NEUTROPHILS NFR BLD AUTO: 67.9 %
NITRITE UR QL STRIP: NEGATIVE
PH UR STRIP: 7.5 [PH] (ref 5–8)
PLATELET # BLD AUTO: 147 THOUSAND/UL (ref 140–400)
PMV BLD REES-ECKER: 11.6 FL (ref 7.5–12.5)
PROT UR QL STRIP: NEGATIVE
RBC # BLD AUTO: 4.13 MILLION/UL (ref 3.8–5.1)
RBC #/AREA URNS HPF: ABNORMAL /HPF
RH BLD: NORMAL
RPR SER QL: NORMAL
RUBV IGG SERPL IA-ACNC: 6.79 INDEX
SP GR UR STRIP: 1.02 (ref 1–1.03)
SQUAMOUS #/AREA URNS HPF: ABNORMAL /HPF
WBC # BLD AUTO: 5.1 THOUSAND/UL (ref 3.8–10.8)
WBC #/AREA URNS HPF: ABNORMAL /HPF

## 2024-08-15 ENCOUNTER — TELEPHONE (OUTPATIENT)
Age: 29
End: 2024-08-15

## 2024-08-15 NOTE — TELEPHONE ENCOUNTER
Incoming call from patient. Patient is currently sick with cold - has a sore throat. Inquiring if throat calm by boiron is safe in pregnancy. Will need provider review.     RN reviewed OTC medications safe in pregnancy listed on handout. Patient would like to know if she can take throat calm.     Routing to provider for review.

## 2024-08-22 ENCOUNTER — INITIAL PRENATAL (OUTPATIENT)
Dept: OBGYN CLINIC | Facility: CLINIC | Age: 29
End: 2024-08-22
Payer: COMMERCIAL

## 2024-08-22 VITALS — BODY MASS INDEX: 30 KG/M2 | DIASTOLIC BLOOD PRESSURE: 64 MMHG | SYSTOLIC BLOOD PRESSURE: 106 MMHG | WEIGHT: 174.8 LBS

## 2024-08-22 DIAGNOSIS — O99.211 OBESITY AFFECTING PREGNANCY IN FIRST TRIMESTER, UNSPECIFIED OBESITY TYPE: Primary | ICD-10-CM

## 2024-08-22 DIAGNOSIS — Z3A.11 11 WEEKS GESTATION OF PREGNANCY: ICD-10-CM

## 2024-08-22 LAB
SL AMB  POCT GLUCOSE, UA: NORMAL
SL AMB POCT URINE PROTEIN: NORMAL

## 2024-08-22 PROCEDURE — PNV: Performed by: OBSTETRICS & GYNECOLOGY

## 2024-08-22 PROCEDURE — 81002 URINALYSIS NONAUTO W/O SCOPE: CPT | Performed by: OBSTETRICS & GYNECOLOGY

## 2024-08-22 NOTE — PROGRESS NOTES
[facility] - [place]  [address] [place] [address]    Assessment/Plan:  Greer is a 29 y.o. year old who presents for initiation of prenatal care.    1. BMI 30  Assessment & Plan:  Early 1hr wnl  2. 11 weeks gestation of pregnancy  Assessment & Plan:  Has MFM appt this week  Orders:  -     POCT urine dip  -     Chlamydia/N. gonorrhoeae RNA, TMA      Subjective:   CC:  Amenorrhea  Greer Lu is a 29 y.o.  female who presents for initiation of prenatal care.   Pregnancy ROS: Denies leakage of fluid, pelvic pain, or vaginal bleeding. Denies nausea/vomiting.        Past Medical History:   Diagnosis Date    GERD (gastroesophageal reflux disease)     Kidney stone 2018    passed stone    Varicella      Past Surgical History:   Procedure Laterality Date    TONSILLECTOMY  2017    TOOTH EXTRACTION  2012     Family History   Problem Relation Age of Onset    Other Mother         pre-cancerous cells - breast biopsy - Tamoxifen x 5 yrs    Diabetes Father         type 2    Crohn's disease Sister         half sister    No Known Problems Maternal Grandmother     No Known Problems Maternal Grandfather     Breast cancer Paternal Grandmother         over age 50    Thyroid cancer Paternal Grandmother         over age 50    Diabetes Paternal Grandfather         type 1    Ovarian cancer Neg Hx     Colon cancer Neg Hx      Social History     Socioeconomic History    Marital status: /Civil Union     Spouse name: Not on file    Number of children: Not on file    Years of education: Not on file    Highest education level: Not on file   Occupational History    Not on file   Tobacco Use    Smoking status: Former     Types: Cigarettes     Passive exposure: Never    Smokeless tobacco: Never   Vaping Use    Vaping status: Never Used   Substance and Sexual Activity    Alcohol use: Not Currently     Comment: social    Drug use: No    Sexual activity: Yes     Partners: Male     Comment: Oral contraceptive use     Other Topics Concern    Not on file   Social History Narrative    Not on file     Social Determinants of Health     Financial Resource Strain: Not on file   Food Insecurity: Not on file   Transportation Needs: Not on file   Physical Activity: Not on file   Stress: Not on file   Social Connections: Not on file   Intimate Partner Violence: Not on file   Housing Stability: Not on file     Outpatient Medications Marked as Taking for the 24 encounter (Initial Prenatal) with Fernando Muñiz V, DO   Medication    Prenatal Vit-Fe Fumarate-FA (PRENATAL VITAMIN PO)     Allergies   Allergen Reactions    Amoxicillin Rash             Objective:     /64   Wt 79.3 kg (174 lb 12.8 oz)   LMP 2024 (Exact Date)   BMI 30.00 kg/m²   Pregravid Weight/BMI: 78 kg (172 lb) (BMI 29.51)  Current Weight: 79.3 kg (174 lb 12.8 oz)   Total Weight Gain: 1.27 kg (2 lb 12.8 oz)   Pre-Renuka Vitals      Flowsheet Row Most Recent Value   Prenatal Assessment    Fetal Heart Rate 167   Movement Present   Prenatal Vitals    Blood Pressure 106/64   Weight - Scale 79.3 kg (174 lb 12.8 oz)   Urine Albumin/Glucose    Dilation/Effacement/Station    Vaginal Drainage    Edema              Chaperone present? Pt declined.    General Appearance: alert and oriented, in no acute distress.   Neck/Thyroid: No thyromegaly, no thyroid nodules.  Respiratory: Unlabored breathing.  Cardiovascular: Regular rate, no peripheral edema.  Abdomen: Soft, non-tender, non-distended, no masses, no rebound or guarding.  Breast Exam: No dimpling, nipple retraction or discharge. No lumps or masses. No axillary or supraclavicular nodes.   Pelvic:       External genitalia: Normal appearance, no abnormal pigmentation, no lesions or masses. Normal Bartholin's and Wassaic's.      Urinary system: Urethral meatus normal, bladder non-tender.      Vaginal: normal mucosa without prolapse or lesions. Normal-appearing physiologic discharge.      Cervix: Normal-appearing,  well-epithelialized, no gross lesions or masses. No cervical motion tenderness.      Adnexa: No adnexal masses or tenderness noted.      Uterus: Approximately 12 week-sized, regular contour, midline, mobile, no uterine tenderness.  Extremities: Normal range of motion. Warm, well-perfused, non-tender.   Skin: normal, no rash or abnormalities  Neurologic: alert, oriented x3  Psychiatric: Appropriate affect, mood stable, cooperative with exam.        Fernando Muñiz DO  8/22/2024 9:45 AM

## 2024-08-22 NOTE — ASSESSMENT & PLAN NOTE
Has MFM appt this week   [Patient] : patient [Mother] : mother [Follow-Up: _____] : [unfilled] is  being seen for a [unfilled] follow-up visit

## 2024-08-23 LAB
C TRACH RRNA SPEC QL NAA+PROBE: NOT DETECTED
N GONORRHOEA RRNA SPEC QL NAA+PROBE: NOT DETECTED

## 2024-08-28 ENCOUNTER — ROUTINE PRENATAL (OUTPATIENT)
Dept: PERINATAL CARE | Facility: OTHER | Age: 29
End: 2024-08-28
Payer: COMMERCIAL

## 2024-08-28 VITALS
HEIGHT: 64 IN | DIASTOLIC BLOOD PRESSURE: 70 MMHG | BODY MASS INDEX: 29.78 KG/M2 | WEIGHT: 174.4 LBS | SYSTOLIC BLOOD PRESSURE: 126 MMHG | HEART RATE: 94 BPM

## 2024-08-28 DIAGNOSIS — Z36.82 ENCOUNTER FOR ANTENATAL SCREENING FOR NUCHAL TRANSLUCENCY: Primary | ICD-10-CM

## 2024-08-28 DIAGNOSIS — Z3A.12 12 WEEKS GESTATION OF PREGNANCY: ICD-10-CM

## 2024-08-28 DIAGNOSIS — Z3A.01 7 WEEKS GESTATION OF PREGNANCY: ICD-10-CM

## 2024-08-28 PROCEDURE — 99243 OFF/OP CNSLTJ NEW/EST LOW 30: CPT | Performed by: OBSTETRICS & GYNECOLOGY

## 2024-08-28 PROCEDURE — 76813 OB US NUCHAL MEAS 1 GEST: CPT | Performed by: OBSTETRICS & GYNECOLOGY

## 2024-08-28 PROCEDURE — 76801 OB US < 14 WKS SINGLE FETUS: CPT | Performed by: OBSTETRICS & GYNECOLOGY

## 2024-08-28 NOTE — LETTER
August 28, 2024     Fernando Muñiz V,   [address]  [place] [address]    Patient: Greer Lu   YOB: 1995   Date of Visit: 8/28/2024       Dear Dr. Muñiz:    Thank you for referring Greer Lu to me for evaluation. Below are my notes for this consultation.    If you have questions, please do not hesitate to call me. I look forward to following your patient along with you.         Sincerely,        Jose Colon MD        CC: No Recipients    Jose Colon MD  8/28/2024 10:40 AM  Sign when Signing Visit  Please refer to the [facility] ultrasound report in Ob Procedures for additional information regarding today's visit

## 2024-08-28 NOTE — PROGRESS NOTES
Please refer to the [facility] ultrasound report in Ob Procedures for additional information regarding today's visit

## 2024-09-18 ENCOUNTER — ROUTINE PRENATAL (OUTPATIENT)
Dept: OBGYN CLINIC | Facility: CLINIC | Age: 29
End: 2024-09-18
Payer: COMMERCIAL

## 2024-09-18 VITALS — WEIGHT: 175.2 LBS | SYSTOLIC BLOOD PRESSURE: 110 MMHG | DIASTOLIC BLOOD PRESSURE: 56 MMHG | BODY MASS INDEX: 30.07 KG/M2

## 2024-09-18 DIAGNOSIS — O99.211 OBESITY AFFECTING PREGNANCY IN FIRST TRIMESTER, UNSPECIFIED OBESITY TYPE: ICD-10-CM

## 2024-09-18 DIAGNOSIS — N81.89 PELVIC FLOOR WEAKNESS: ICD-10-CM

## 2024-09-18 DIAGNOSIS — Z3A.15 15 WEEKS GESTATION OF PREGNANCY: Primary | ICD-10-CM

## 2024-09-18 LAB
SL AMB  POCT GLUCOSE, UA: NEGATIVE
SL AMB POCT URINE PROTEIN: NEGATIVE

## 2024-09-18 PROCEDURE — 81002 URINALYSIS NONAUTO W/O SCOPE: CPT | Performed by: NURSE PRACTITIONER

## 2024-09-18 PROCEDURE — PNV: Performed by: NURSE PRACTITIONER

## 2024-09-18 NOTE — PROGRESS NOTES
1350: pt to ED US for paracentesis. Pts HR noted to be in 140s in the office, now in 120s and 130s. Called Dr. Cristi Sena regarding above, no new orders received at this time. Will administer album per orders if >5L fluid removed. 1400: procedure start- 6 [place] paracentesis drain inserted into r abdomen by Dr. Carrol Simms- currently draining  1435: procedure complete- 4400 mls serous fluid drained and drain subsequently removed. Pt tolerated well. Pt reporting less pain, HR in 110s now. I have reviewed discharge instructions with the patient. The patient verbalized understanding. Routine Prenatal Visit  [facility] [place]  [address], [place] [address]    Assessment/Plan:  Greer is a 29 y.o. year old  at 15w5d who presents for routine prenatal visit.     1. 15 weeks gestation of pregnancy  Assessment & Plan:  Declined genetics, but will do AFP, discussed timing  Discussed round ligament pain  Orders:  -     POCT urine dip  -     Alpha fetoprotein, maternal; Future  -     Alpha fetoprotein, maternal  -     Ambulatory Referral to Physical Therapy; Future  2. BMI 30  Assessment & Plan:  Early 1 hour gtt nomal, repeat at 28 weeks  3. Pelvic floor weakness  Assessment & Plan:  Some urinary leakage  Referred to Pelvic PT  Orders:  -     Ambulatory Referral to Physical Therapy; Future      Next OB Visit 4 weeks.    Subjective:     CC: Prenatal care    Greer Lu is a 29 y.o.  female who presents for routine prenatal care at 15w5d.  Feels well. C/o some urinary leakage and ligament pain. No abdominal pain, no dysuria.   Pregnancy ROS: no leakage of fluid, pelvic pain, or vaginal bleeding.  normal fetal movement.    The following portions of the patient's history were reviewed and updated as appropriate: allergies, current medications, past family history, past medical history, obstetric history, gynecologic history, past social history, past surgical history and problem list.      Objective:  /56   Wt 79.5 kg (175 lb 3.2 oz)   LMP 2024 (Exact Date)   BMI 30.07 kg/m²   Pregravid Weight/BMI: 78 kg (172 lb) (BMI 29.51)  Current Weight: 79.5 kg (175 lb 3.2 oz)   Total Weight Gain: 1.452 kg (3 lb 3.2 oz)   Pre- Vitals      Flowsheet Row Most Recent Value   Prenatal Assessment    Fetal Heart Rate 154   Fundal Height (cm) 15 cm   Movement Present   Prenatal Vitals    Blood Pressure 110/56   Weight - Scale 79.5 kg (175 lb 3.2 oz)   Urine Albumin/Glucose    Dilation/Effacement/Station    Vaginal Drainage    Draining Fluid No   Edema    LLE Edema None    RLE Edema None   Facial Edema None             General: Well appearing, no distress  Abdomen: Soft, gravid, nontender  Extremities: Non tender.

## 2024-10-01 LAB
# FETUSES US: 1
AFP ADJ MOM SERPL: 0.75
AFP INTERP SERPL-IMP: NORMAL
AFP SERPL-MCNC: 26.1 NG/ML
AGE: NORMAL
DONATED EGG PATIENT QL: NO
GA CLIN EST: 17 WEEKS
GA METHOD: NORMAL
HX OF NTD NARR: YES
HX OF TRISOMY 21 NARR: NO
IDDM PATIENT QL: NO
NEURAL TUBE DEFECT RISK FETUS: NORMAL %
SL AMB REPEAT SPECIMEN: NO

## 2024-10-17 ENCOUNTER — ROUTINE PRENATAL (OUTPATIENT)
Dept: OBGYN CLINIC | Facility: CLINIC | Age: 29
End: 2024-10-17
Payer: COMMERCIAL

## 2024-10-17 VITALS
SYSTOLIC BLOOD PRESSURE: 108 MMHG | WEIGHT: 177.6 LBS | BODY MASS INDEX: 30.32 KG/M2 | DIASTOLIC BLOOD PRESSURE: 60 MMHG | HEIGHT: 64 IN

## 2024-10-17 DIAGNOSIS — Z3A.19 19 WEEKS GESTATION OF PREGNANCY: ICD-10-CM

## 2024-10-17 DIAGNOSIS — Z34.82 PRENATAL CARE, SUBSEQUENT PREGNANCY, SECOND TRIMESTER: Primary | ICD-10-CM

## 2024-10-17 PROBLEM — Z3A.15 15 WEEKS GESTATION OF PREGNANCY: Status: ACTIVE | Noted: 2024-08-22

## 2024-10-17 LAB
SL AMB  POCT GLUCOSE, UA: NORMAL
SL AMB POCT URINE PROTEIN: NORMAL

## 2024-10-17 PROCEDURE — 81002 URINALYSIS NONAUTO W/O SCOPE: CPT | Performed by: OBSTETRICS & GYNECOLOGY

## 2024-10-17 PROCEDURE — PNV: Performed by: OBSTETRICS & GYNECOLOGY

## 2024-10-17 NOTE — PROGRESS NOTES
"Routine Prenatal Visit  [facility] [place]  [address] [facility] [address]    Assessment/Plan:  Greer is a 29 y.o. year old  at 19w6d who presents for routine prenatal visit.     1. Prenatal care, subsequent pregnancy, second trimester  2. 19 weeks gestation of pregnancy  Assessment & Plan:  Reviewed low risk AFP test. Anatomy U/S scheduled for next week.  Orders:  -     POCT urine dip        Subjective:     CC: Prenatal care    Greer Lu is a 29 y.o.  female who presents for routine prenatal care at 19w6d.  Pregnancy ROS: no leakage of fluid, pelvic pain, or vaginal bleeding.  normal fetal movement.    The following portions of the patient's history were reviewed and updated as appropriate: allergies, current medications, past family history, past medical history, obstetric history, gynecologic history, past social history, past surgical history and problem list.      Objective:  /60 (BP Location: Left arm, Patient Position: Sitting, Cuff Size: Standard)   Ht 5' 4\" (1.626 m)   Wt 80.6 kg (177 lb 9.6 oz)   LMP 2024 (Exact Date)   BMI 30.48 kg/m²   Pregravid Weight/BMI: 78 kg (172 lb) (BMI 29.51)  Current Weight: 80.6 kg (177 lb 9.6 oz)   Total Weight Gain: 2.54 kg (5 lb 9.6 oz)   Pre-Renuka Vitals      Flowsheet Row Most Recent Value   Prenatal Assessment    Fetal Heart Rate 150   Fundal Height (cm) 20 cm   Movement Present   Prenatal Vitals    Blood Pressure 108/60   Weight - Scale 80.6 kg (177 lb 9.6 oz)   Urine Albumin/Glucose    Dilation/Effacement/Station    Vaginal Drainage    Edema              General: Well appearing, no distress  Respiratory: Unlabored breathing  Cardiovascular: Regular rate.  Abdomen: Soft, gravid, nontender  Fundal Height: Appropriate for gestational age.  Extremities: Warm and well perfused.  Non tender.  "

## 2024-10-23 NOTE — PATIENT INSTRUCTIONS
Thank you for choosing us for your  care today.  If you have any questions about your ultrasound or care, please do not hesitate to contact us or your primary obstetrician.        Some general instructions for your pregnancy are:    Exercise: Aim for 150 minutes per week of regular exercise.  Walking is great!  Nutrition: Choose healthy sources of calcium, iron, and protein.  Avoid ultraprocessed foods and added sugar.  Learn about Preeclampsia: preeclampsia is a common, potentially serious high blood pressure complication in pregnancy.  A blood pressure of 140mmHg (systolic or top number) or 90mmHg (diastolic or bottom number) should be evaluated by your doctor.  Aspirin is sometimes prescribed in early pregnancy to prevent preeclampsia in women with risk factors - ask your obstetrician if you should be on this medication.  For more resources, visit:  https://[email or web address].[email or web address]  If you smoke, please try to quit completely but also try to reduce your smoking by as much as possible (as soon as possible).  Do not vape.  Please also avoid cannabis products.  Other warning signs to watch out for in pregnancy or postpartum: chest pain, obstructed breathing or shortness of breath, seizures, thoughts of hurting yourself or your baby, bleeding, a painful or swollen leg, fever, or headache (see [facility]WH[facility] POST-BIRTH Warning Signs campaign).  If these happen call 911.  Itching is also not normal in pregnancy and if you experience this, especially over your hands and feet, potentially worse at night, notify your doctors.        Hide Additional Notes?: No Include Location In Plan?: Yes Detail Level: Generalized

## 2024-10-25 ENCOUNTER — ROUTINE PRENATAL (OUTPATIENT)
Dept: PERINATAL CARE | Facility: OTHER | Age: 29
End: 2024-10-25
Payer: COMMERCIAL

## 2024-10-25 VITALS
WEIGHT: 178.4 LBS | BODY MASS INDEX: 30.46 KG/M2 | HEIGHT: 64 IN | SYSTOLIC BLOOD PRESSURE: 120 MMHG | HEART RATE: 88 BPM | DIASTOLIC BLOOD PRESSURE: 66 MMHG

## 2024-10-25 DIAGNOSIS — Z36.86 ENCOUNTER FOR ANTENATAL SCREENING FOR CERVICAL LENGTH: ICD-10-CM

## 2024-10-25 DIAGNOSIS — Z36.3 ENCOUNTER FOR ANTENATAL SCREENING FOR MALFORMATION USING ULTRASOUND: Primary | ICD-10-CM

## 2024-10-25 DIAGNOSIS — Z3A.21 21 WEEKS GESTATION OF PREGNANCY: ICD-10-CM

## 2024-10-25 PROCEDURE — 99212 OFFICE O/P EST SF 10 MIN: CPT | Performed by: OBSTETRICS & GYNECOLOGY

## 2024-10-25 PROCEDURE — 76805 OB US >/= 14 WKS SNGL FETUS: CPT | Performed by: OBSTETRICS & GYNECOLOGY

## 2024-10-25 PROCEDURE — 76817 TRANSVAGINAL US OBSTETRIC: CPT | Performed by: OBSTETRICS & GYNECOLOGY

## 2024-10-25 NOTE — PROGRESS NOTES
The patient was seen today for an ultrasound.  Please see ultrasound report (located under Ob Procedures) for additional details.   Thank you very much for allowing us to participate in the care of this very nice patient.  Should you have any questions, please do not hesitate to contact me.     Aidan Lopez MD FACOG  Attending Physician, Maternal-Fetal Medicine  [facility]

## 2024-10-25 NOTE — PROGRESS NOTES
Ultrasound Probe Disinfection    A transvaginal ultrasound was performed.   Prior to use, disinfection was performed with High Level Disinfection Process ([employer]).  Probe serial number [id number] was used.    Angie Bonilla  10/25/24  2:22 PM

## 2024-10-29 ENCOUNTER — NURSE TRIAGE (OUTPATIENT)
Age: 29
End: 2024-10-29

## 2024-10-29 NOTE — TELEPHONE ENCOUNTER
"21 weeks 4 days, EDC 3/7/25 c/o left sided pain from low pelvic area up to side of her upper abdomen. Pain started 8 AM, constant , rates 3/10 with radiation into low back at times. Has had hx of sciatica throughout her pregnancy., this is not symptom now. Position change does not change pain. No bleeding, no LOF , no contractions, feels baby movement.  Patient states she had intercourse last night and this may be contributing to discomfort.Patient advised to avoid sudden position changes, try warm bath, Tylenol, if  pain becomes mores severe, any vaginal bleeding or spotting or any concerning symptoms to call back. Patient verbalzied understanding.  ESC Dr. Fernando Salamanca- reviewed      Reason for Disposition   Round ligament pain (previously diagnosed by doctor or NP/PA), questions about    Answer Assessment - Initial Assessment Questions  1. LOCATION: \"Where does it hurt?\"       Left side pain pelvic area to upper abdomen  2. RADIATION: \"Does the pain shoot anywhere else?\" (e.g., chest, back)      Low back  3. ONSET: \"When did the pain begin?\" (Minutes, hours or days ago)       8AM  4. SUDDEN: \"Gradual or sudden onset?\"      Sudden but not sharp   5. PATTERN: \"Does the pain come and go, or has it been constant since it started?\"       constant  6. SEVERITY: \"How bad is the pain?\" \"What does it keep you from doing?\"  (e.g., Scale 1-10; mild, moderate, or severe)      3/10  7. RECURRENT SYMPTOM: \"Have you ever had this type of stomach pain before?\" If Yes, ask: \"When was the last time?\" and \"What happened that time?\"       denies  8. CAUSE: \"What do you think is causing the pain?      unsure  9. RELIEVING/AGGRAVATING FACTORS: \"What makes it better or worse?\" (e.g., antacids, bowel movement, movement)      denies  10. FETAL MOVEMENT: \"Has the baby's movement decreased or changed significantly from normal?\"        Feels baby active  11. OTHER SYMPTOMS: \"Do you have any other symptoms?\" (e.g., back pain, " "contractions, diarrhea, fever, headache, urination pain, vaginal bleeding, vaginal discharge, vomiting)        denies  12. SURINDER: \"What date are you expecting to deliver?\"        21 weeks 4 days    Protocols used: Pregnancy - Abdominal Pain Greater Than 20 Weeks EGA-Adult-AH    "

## 2024-11-06 ENCOUNTER — EVALUATION (OUTPATIENT)
Dept: PHYSICAL THERAPY | Facility: CLINIC | Age: 29
End: 2024-11-06
Payer: COMMERCIAL

## 2024-11-06 DIAGNOSIS — N81.89 PELVIC FLOOR WEAKNESS: Primary | ICD-10-CM

## 2024-11-06 DIAGNOSIS — R32 URINARY INCONTINENCE IN FEMALE: ICD-10-CM

## 2024-11-06 DIAGNOSIS — M62.89 PELVIC FLOOR DYSFUNCTION IN FEMALE: ICD-10-CM

## 2024-11-06 DIAGNOSIS — Z3A.15 15 WEEKS GESTATION OF PREGNANCY: ICD-10-CM

## 2024-11-06 PROCEDURE — 97110 THERAPEUTIC EXERCISES: CPT

## 2024-11-06 PROCEDURE — 97162 PT EVAL MOD COMPLEX 30 MIN: CPT

## 2024-11-06 NOTE — PROGRESS NOTES
PT Evaluation     Today's date: 2024  Patient name: Greer Lu  : 1995  MRN: [id number]  Referring provider: Amina Arshad CRNP  Dx:   Encounter Diagnosis     ICD-10-CM    1. Pelvic floor weakness  N81.89 Ambulatory Referral to Physical Therapy      2. 15 weeks gestation of pregnancy  Z3A.15 Ambulatory Referral to Physical Therapy      3. Pelvic floor dysfunction in female  M62.89       4. Urinary incontinence in female  R32           Start Time: 1018  Stop Time: 1100  Total time in clinic (min): 42 minutes    Assessment  Impairments: abnormal muscle firing, abnormal muscle tone, abnormal or restricted ROM, activity intolerance, impaired physical strength, lacks appropriate home exercise program, pain with function, weight-bearing intolerance, participation limitations, activity limitations and endurance    Assessment details: The pt is a 29 year old female who pregnant and presents to skilled physical therapy services due to a decline in functional status related to pelvic pain and urinary incontinence. Upon completion of the IE, the pt presents with impairments in pain, muscle tension, palpation tenderness, LE functional strength, as well as neuromuscular control/strength/endurance of PF musculature. As a result of these impairments, the pt has difficulty with the following functional activities: prolonged positioning, .bed mobility, STS transfers,,bowel function (constipation), and bladder function. POC will include manual therapy for flexibility/mobility and symptom modulation, modalities (PRN), TE/TA/NR interventions for functional strength and neuromuscular control, HEP, and pt education. The pt will continue to benefit from skilled physical therapy services to address impairments in order to improve her quality of life, symptom management, and bladder function.    Thank you for there referral.    Barriers to therapy: CURRENTLY PREGNANT (Due: 3/7/2025) -- PMH includes: Nephrolithiasis,  Allergic rhinitis, High risk for Breast Cancer, GERD, Kidney Stone (2018)  Barriers to intervention: medical complexity  Understanding of Dx/Px/POC: good     Prognosis: good    Goals  - Pt will have decreased lumbopelvic muscle overactivity to mild-trace to reflect a reduction in spasms in order to improve functional abilities.  - Pt will be independent with HEP, with proper demonstration and understanding for long-term management of functional abilities and quality of life.  - Pt will be knowledgeable of postural strategies to optimize toileting techniques.  - Pt is able to cough, laugh, and sneeze without bladder leakage in order to improve quality of life and decrease need/dependency on panty liners.  - Pt reports at least a 75% reduction in urinary incontinence indicating decreased effect of urinary incontinence on function.  - Pt reports improved fluid intake for reduced bladder urgency/frequency.  - Pt has implemented urge suppression strategies throughout the day and reports average voiding interval is 2-3 hours upon discharge in order to have more functional bladder functioning.      Plan  Patient would benefit from: skilled physical therapy and women's health eval  Referral necessary: Yes  Planned modality interventions: thermotherapy: hydrocollator packs    Planned therapy interventions: abdominal trunk stabilization, joint mobilization, manual therapy, body mechanics training, neuromuscular re-education, nerve gliding, breathing training, patient/caregiver education, self care, flexibility, gait training, therapeutic exercise, strengthening, therapeutic activities, stretching and home exercise program    Frequency: 1x week  Plan of Care beginning date: 11/6/2024  Plan of Care expiration date: 1/10/2025  Treatment plan discussed with: patient    PT Pelvic Floor Subjective:   History of Present Illness:     The pt reports to skilled physical therapy to address impairments in pelvic pain and urinary  "incontinences during her pregnancy. Pt is currently pregnant with her 2nd child. Pt reports a progressive increase in urinary leakage and difficulty emptying her bladder over the course of her pregnancy. Pt also reports an onset of anterior pelvic pain within the past month. -- Pt also reports \"sciatica\" in her left leg    During his first pregnancy, she experienced a little urinary leakage, but more occurred after labor/birthing. She reports tearing anteriorly during vaginal delivery. 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; on feet for work    Not exercising due to: lack of time and bladder incontinence  OB/ gyn History    Gestational History:     Prior Pregnancy: Yes      Number of prior pregnancies: 2    Number of term pregnancies: 1    Delivery Type: vaginal delivery      Number of vaginal deliveries: 1    Delivery Complications:  Tearing anteriorly; treated with stitches; slow healing    Menstrual History:      Tolerates tampons: yes (during menstrual cycle)  no hormone replacement therapy  Bladder Function:     Voiding Difficulties positive for: urgency, frequent urination and incomplete emptying       Voiding Difficulties comments:     Voiding frequency: every 1-2 hours and every 31-60 minutes    Urinary leakage: urine leakage (Leakage daily --- typically after urinating)    Urinary leakage aggravated by: coughing, sneezing, exercise, standing up, post-void dribble and laughing    Urinary leakage not aggravated by: bending and walking to the bathroom    Nocturia (episodes per night): 1    " "Painful urination: No      Fluid Intake Type:  Water, coffee and juice    Intake (ounces): Juice intake (oz): Occasional apple or orange juice. Coffee intake (oz): 1 cup in morning.   Incontinence Management:     Pads/Diaper Use:  Day    Pads/Diapers Additional Comments: Thin liner  Bowel Function:     Voiding DIfficulties: constipation      Bowel frequency: daily and every 2 days    Stool softener use: no stool softeners    Uses \"squatty potty\": no Squatty Potty  Sexual Function:     Sexually Active:  Sexually active    Pain during intercourse: No    Pain:     Location:  Anterior pelvic pain    Onset:  1-3 months ago    Quality:  Dull ache    Aggravating factors:  Sit to stand transition and prolonged positions (Lying down; supine to sit/stand transfers)    Relieving factors:  Change in position  Treatments:     Current treatment: physical therapy    Patient Goals:     Patient goals for therapy:  Improved bladder or bowel function, fully empty bladder or bowels, decreased pain, improved pain management, improved quality of life, improved comfort and relaxation      Objective     Static Posture     Shoulders  Rounded.    Palpation   Left   Tenderness of the adductor brevis, adductor longus, gluteus ligia, gluteus medius and piriformis.     Right   Tenderness of the adductor brevis, adductor longus, gluteus ligia, gluteus medius and piriformis.     Tenderness     Left Hip   Tenderness in the sacroiliac joint.     Right Hip   Tenderness in the sacroiliac joint.     Strength/Myotome Testing     Left Hip   Planes of Motion   Flexion: 4    Right Hip   Planes of Motion   Flexion: 4+    Ambulation   Weight-Bearing Status   Weight-Bearing Status (Left): full weight bearing   Weight-Bearing Status (Right): full weight-bearing    Assistive device used: none      Abdominal Assessment:          General Perineum Exam:     General perineum exam comments:   INITIAL EVALUATION: Assessment of perineum and internal pelvic floor " "musculature was held as the pt is currently 22 weeks pregnant. Increased focused on pt's subjective report and pt education.    Visual Inspection of Perineum:   PFM Contraction Comments:   INITIAL EVALUATION: Assessment of perineum and internal pelvic floor musculature was held as the pt is currently 22 weeks pregnant. Increased focused on pt's subjective report and pt education.    Pelvic Organ Prolapse   Comments:   INITIAL EVALUATION: Assessment of perineum and internal pelvic floor musculature was held as the pt is currently 22 weeks pregnant. Increased focused on pt's subjective report and pt education.        Pelvic Floor Muscle Exam:             pelvic floor exam consent given by patient                Precautions: -- CURRENTLY PREGNANT (Due: 3/7/2025) -- PMH includes: Nephrolithiasis, Allergic rhinitis, High risk for Breast Cancer, GERD, Kidney Stone (2018)       11/6            Manuals             STM/MFR             Fascial Mobilization                                       Neuro Re-Ed             PPT  10x w/ 5\" hold            Reverse clamshell 10x ea LE                                                                             Ther Ex             Butterfly Stretch: Supine 1x30\"            Happy Baby Stretch: Seated 1x30\"                         POC; PF Physical Therapy KS            Address pt questions PRN KS            Anatomy as it relates to pt's symptoms/presentation KS            HEP KS                         Ther Activity                                       Gait Training                                       Modalities                                            "

## 2024-11-12 NOTE — PROGRESS NOTES
"Daily Note     Today's date: 2024  Patient name: Greer Lu  : 1995  MRN: [id number]  Referring provider: Amina Arshad CRNP  Dx:   Encounter Diagnosis     ICD-10-CM    1. Pelvic floor weakness  N81.89       2. 15 weeks gestation of pregnancy  Z3A.15       3. Pelvic floor dysfunction in female  M62.89       4. Urinary incontinence in female  R32           Start Time: 1018  Stop Time: 1101  Total time in clinic (min): 43 minutes    Subjective: The pt reports that she only did her exercises a few times this past week. She notes an increase in pelvic pain. \"I feel like all my pain is anterior.\" -- Pt notes \"moderate\" urinary leakage this past week.      Objective: See treatment diary below      Assessment: The pt participated in a skilled physical therapy session that focused on manual intervention, neuromuscular re-education, and therapeutic exercise. The pt presented with significant muscle tension within the hip adductor musculature bilaterally, particularly near the proximal attachments by the pubis, which was addressed with manual intervention. The cue to brace her core while changing positions/transferring (e.g., bed mobility) was effective as noted by minimal to no pelvic pain when transferring side-lying to/from supine and with side-lying to sit with core activation. She tolerated treatment well. The pt would benefit from continued PT to address impairments in order to improve her quality of life, symptom management, and bladder function.       Plan: Continue per plan of care.      Precautions: -- CURRENTLY PREGNANT (Due: 3/7/2025) -- PMH includes: Nephrolithiasis, Allergic rhinitis, High risk for Breast Cancer, GERD, Kidney Stone (2018)                  Manuals             STM/MFR  KS (hip adductors; PFM; posterior pelvis)           Fascial Mobilization  KS (anterior pelvis)                                     Neuro Re-Ed             PPT  10x w/ 5\" hold 2x10 w/ 5\" hold         " "  Reverse clamshell 10x ea LE            PPT + Iso Hip ABD  10x w/ 5\" hold                                                               Ther Ex             Butterfly Stretch: Supine 1x30\"            Happy Baby Stretch: Seated 1x30\" 2x30\"                        POC; PF Physical Therapy KS            Address pt questions PRN KS KS           Anatomy as it relates to pt's symptoms/presentation KS KS           HEP KS KS           Update on pt's symptoms/status  KS                        Ther Activity             Core brace for transfers  KS                        Gait Training                                       Modalities                                            "

## 2024-11-13 ENCOUNTER — OFFICE VISIT (OUTPATIENT)
Dept: PHYSICAL THERAPY | Facility: CLINIC | Age: 29
End: 2024-11-13
Payer: COMMERCIAL

## 2024-11-13 DIAGNOSIS — R32 URINARY INCONTINENCE IN FEMALE: ICD-10-CM

## 2024-11-13 DIAGNOSIS — M62.89 PELVIC FLOOR DYSFUNCTION IN FEMALE: ICD-10-CM

## 2024-11-13 DIAGNOSIS — N81.89 PELVIC FLOOR WEAKNESS: Primary | ICD-10-CM

## 2024-11-13 DIAGNOSIS — Z3A.15 15 WEEKS GESTATION OF PREGNANCY: ICD-10-CM

## 2024-11-13 PROCEDURE — 97140 MANUAL THERAPY 1/> REGIONS: CPT

## 2024-11-13 PROCEDURE — 97110 THERAPEUTIC EXERCISES: CPT

## 2024-11-13 PROCEDURE — 97112 NEUROMUSCULAR REEDUCATION: CPT

## 2024-11-14 ENCOUNTER — ROUTINE PRENATAL (OUTPATIENT)
Dept: OBGYN CLINIC | Facility: CLINIC | Age: 29
End: 2024-11-14
Payer: COMMERCIAL

## 2024-11-14 ENCOUNTER — TELEPHONE (OUTPATIENT)
Dept: OBGYN CLINIC | Facility: CLINIC | Age: 29
End: 2024-11-14

## 2024-11-14 VITALS
SYSTOLIC BLOOD PRESSURE: 114 MMHG | BODY MASS INDEX: 30.9 KG/M2 | WEIGHT: 181 LBS | HEIGHT: 64 IN | DIASTOLIC BLOOD PRESSURE: 60 MMHG

## 2024-11-14 DIAGNOSIS — Z3A.23 23 WEEKS GESTATION OF PREGNANCY: ICD-10-CM

## 2024-11-14 DIAGNOSIS — Z34.82 PRENATAL CARE, SUBSEQUENT PREGNANCY, SECOND TRIMESTER: Primary | ICD-10-CM

## 2024-11-14 DIAGNOSIS — Z36.89 ENCOUNTER FOR OTHER SPECIFIED ANTENATAL SCREENING: ICD-10-CM

## 2024-11-14 LAB
SL AMB  POCT GLUCOSE, UA: NORMAL
SL AMB POCT URINE PROTEIN: NORMAL

## 2024-11-14 PROCEDURE — 81002 URINALYSIS NONAUTO W/O SCOPE: CPT | Performed by: OBSTETRICS & GYNECOLOGY

## 2024-11-14 PROCEDURE — PNV: Performed by: OBSTETRICS & GYNECOLOGY

## 2024-11-14 NOTE — PROGRESS NOTES
"Routine Prenatal Visit  [facility] [place]  [address], [place] [address]    Assessment/Plan:  Greer is a 29 y.o. year old  at 23w6d who presents for routine prenatal visit.     1. Prenatal care, subsequent pregnancy, second trimester  2. 23 weeks gestation of pregnancy  -     POCT urine dip  3. Encounter for other specified  screening  -     Glucose, 1H PG; Future  -     CBC; Future  -     RPR (MONITOR) W/REFL TITER (REFL); Future  -     Glucose, 1H PG  -     CBC  -     RPR (MONITOR) W/REFL TITER (REFL)        Subjective:   Greer Lu is a 29 y.o.  who presents for routine prenatal care at 23w6d.  Complaints today: Denies  LOF: -; VB: -; Contractions: -; FM: +    Objective:  /60 (BP Location: Left arm, Patient Position: Sitting, Cuff Size: Standard)   Ht 5' 4\" (1.626 m)   Wt 82.1 kg (181 lb)   LMP 2024 (Exact Date)   BMI 31.07 kg/m²     General: Well appearing, no distress  Respiratory: Unlabored breathing  Abdomen: Soft, gravid, nontender  Extremities: Warm and well perfused.  Non tender.    Pregravid Weight/BMI: 78 kg (172 lb) (BMI 29.51)  Current Weight: 82.1 kg (181 lb)   Total Weight Gain: 4.082 kg (9 lb)     Pre-Renuka Vitals      Flowsheet Row Most Recent Value   Prenatal Assessment    Fetal Heart Rate 150   Movement Present   Prenatal Vitals    Blood Pressure 114/60   Weight - Scale 82.1 kg (181 lb)   Urine Albumin/Glucose    Dilation/Effacement/Station    Vaginal Drainage    Edema              Dionnel DORCAS Muñiz DO  2024 9:23 AM     "

## 2024-11-14 NOTE — TELEPHONE ENCOUNTER
Second trimester call-placed, voicemail received, left a message for patient to call back.     Overall how are you doing?   Seen in the office today, left a message to call if she has any questions.     Compliant with routine OB care appointments? yes    Have you completed your 1st trimester labs? yes    If you had NIPS with MFM, do you have a order for [facility]SA[facility]?   Completed on 9/27/24   Can be completed 15w-22w9d, ideally 16w-18w    Have you seen MFM and do you have your detailed US scheduled? Seen on 10/25/24    Pregnancy Education-have you had a chance to review the classes offered and registered?

## 2024-11-20 ENCOUNTER — OFFICE VISIT (OUTPATIENT)
Dept: PHYSICAL THERAPY | Facility: CLINIC | Age: 29
End: 2024-11-20
Payer: COMMERCIAL

## 2024-11-20 DIAGNOSIS — N81.89 PELVIC FLOOR WEAKNESS: Primary | ICD-10-CM

## 2024-11-20 DIAGNOSIS — Z3A.15 15 WEEKS GESTATION OF PREGNANCY: ICD-10-CM

## 2024-11-20 DIAGNOSIS — M62.89 PELVIC FLOOR DYSFUNCTION IN FEMALE: ICD-10-CM

## 2024-11-20 DIAGNOSIS — R32 URINARY INCONTINENCE IN FEMALE: ICD-10-CM

## 2024-11-20 PROCEDURE — 97140 MANUAL THERAPY 1/> REGIONS: CPT

## 2024-11-20 PROCEDURE — 97110 THERAPEUTIC EXERCISES: CPT

## 2024-11-20 NOTE — PROGRESS NOTES
"Daily Note     Today's date: 2024  Patient name: Greer Lu  : 1995  MRN: [id number]  Referring provider: Amina Arshad CRNP  Dx:   Encounter Diagnosis     ICD-10-CM    1. Pelvic floor weakness  N81.89       2. 15 weeks gestation of pregnancy  Z3A.15       3. Pelvic floor dysfunction in female  M62.89       4. Urinary incontinence in female  R32           Start Time: 09  Stop Time: 1017  Total time in clinic (min): 41 minutes    Subjective: The pt reports that she had a difficult night sleep last night. She lost her pregnancy pillow while sleeping and woke with significant pelvic pain. -- Occasional urinary leakage.      Objective: See treatment diary below      Assessment: The pt participated in a skilled physical therapy session that focused on manual intervention, neuromuscular re-education, and therapeutic exercise. The pt presented with significant muscle tension/trigger points within the hip adductor musculature bilaterally (LLE > RLE), which were addressed with manual intervention. Manual was followed by hip adductor stretches and hip abductor strengthening to further aid in hip/pelvic mobility and flexibility as well as to address strength deficits. She tolerated treatment well. The pt would benefit from continued PT to address impairments in order to improve her quality of life, symptom management, and bladder function.       Plan: Continue per plan of care.      Precautions: -- CURRENTLY PREGNANT (Due: 3/7/2025) -- PMH includes: Nephrolithiasis, Allergic rhinitis, High risk for Breast Cancer, GERD, Kidney Stone (2018)                 Manuals             STM/MFR  KS (hip adductors; PFM; posterior pelvis) KS (hip adductors)          Fascial Mobilization  KS (anterior pelvis) KS (anterior pelvis)                                    Neuro Re-Ed             PPT  10x w/ 5\" hold 2x10 w/ 5\" hold           Reverse clamshell 10x ea LE            PPT + Iso Hip ABD  10x w/ 5\" " "hold           PPT + Bent Knee Fallout   Red TB 2x10 ea LE                                                 Ther Ex             Butterfly Stretch: Supine 1x30\"  2x30\"          Happy Baby Stretch: Seated 1x30\" 2x30\" 1x30\"          Standing Hip Adductor Stretch   Pt edu for HEP                       POC; PF Physical Therapy KS            Address pt questions PRN KS KS KS          Anatomy as it relates to pt's symptoms/presentation KS KS KS          HEP KS KS KS          Update on pt's symptoms/status  KS KS                       Ther Activity             Core brace for transfers  KS                        Gait Training                                       Modalities                                              "

## 2024-11-26 NOTE — PROGRESS NOTES
"Daily Note     Today's date: 2024  Patient name: Greer Lu  : 1995  MRN: [id number]  Referring provider: Amina Arshad CRNP  Dx:   Encounter Diagnosis     ICD-10-CM    1. Pelvic floor weakness  N81.89       2. 15 weeks gestation of pregnancy  Z3A.15       3. Pelvic floor dysfunction in female  M62.89       4. Urinary incontinence in female  R32           Start Time: 933  Stop Time:   Total time in clinic (min): 42 minutes    Subjective: \"I feel like just being pregnant is uncomfortable.\" -- Home exercises have been helpful. -- \"I feel like what we are doing is helping it from getting worse.\"  -- The pt reports a progressive onset/increase in mid-low back pain while sitting on her sofa. It is uncomfortable to sit on the sofa for about 1.5-2 hours at the end of the day when she tries to enjoy time with her . She gets relief with standing.       Objective: See treatment diary below      Assessment:  The pt participated in a skilled physical therapy session that focused on neuromuscular re-education and therapeutic exercise. Pt education was provided regarding sitting posture and use of pillows/lumbar roll to get additional support when seated on softer chairs. The pt presented with an onset/increase in pelvic pain with weight-shifting laterally. NR program was progressed to address impairments in neuromuscular control/strength/endurance of proximal LE musculature necessary for pelvic stability. She tolerated treatment well and was provided with an updated HEP. The pt would benefit from continued PT to address impairments in order to improve her quality of life, symptom management, and bladder function.       Plan: Continue per plan of care.      Precautions: -- CURRENTLY PREGNANT (Due: 3/7/2025) -- PMH includes: Nephrolithiasis, Allergic rhinitis, High risk for Breast Cancer, GERD, Kidney Stone (2018)                Manuals             STM/MFJOELLE ALMAGUER (hip " "adductors; PFM; posterior pelvis) KS (hip adductors)          Fascial Mobilization  KS (anterior pelvis) KS (anterior pelvis)                                    Neuro Re-Ed             PPT  10x w/ 5\" hold 2x10 w/ 5\" hold           Reverse clamshell 10x ea LE            PPT + Iso Hip ABD  10x w/ 5\" hold           PPT + Bent Knee Fallout   Red TB 2x10 ea LE          Cat-cow    10x ea         Standing Hip extension    10x ea LE         Standing Hip Flex    10x ea LE         Glute Sets: Seated    10x w/ 5\" hold         Iso Hip ABD: Seated    10x w/ 5\" hold                                   Ther Ex             Butterfly Stretch: Supine 1x30\"  2x30\"          Happy Baby Stretch: Seated 1x30\" 2x30\" 1x30\"          Standing Hip Adductor Stretch   Pt edu for HEP          Bruce pose    10x w/ 10\" hold                                                POC; PF Physical Therapy KS            Address pt questions PRN KS KS KS KS         Anatomy as it relates to pt's symptoms/presentation KS KS KS KS         HEP KS KS KS KS         Update on pt's symptoms/status  KS KS KS                      Ther Activity             Core brace for transfers  KS           Lumbar Roll/back support     KS         Sitting posture    KS                      Gait Training                                       Modalities                                                "

## 2024-11-27 ENCOUNTER — OFFICE VISIT (OUTPATIENT)
Dept: PHYSICAL THERAPY | Facility: CLINIC | Age: 29
End: 2024-11-27
Payer: COMMERCIAL

## 2024-11-27 DIAGNOSIS — M62.89 PELVIC FLOOR DYSFUNCTION IN FEMALE: ICD-10-CM

## 2024-11-27 DIAGNOSIS — R32 URINARY INCONTINENCE IN FEMALE: ICD-10-CM

## 2024-11-27 DIAGNOSIS — N81.89 PELVIC FLOOR WEAKNESS: Primary | ICD-10-CM

## 2024-11-27 DIAGNOSIS — Z3A.15 15 WEEKS GESTATION OF PREGNANCY: ICD-10-CM

## 2024-11-27 PROCEDURE — 97530 THERAPEUTIC ACTIVITIES: CPT

## 2024-11-27 PROCEDURE — 97112 NEUROMUSCULAR REEDUCATION: CPT

## 2024-11-27 PROCEDURE — 97110 THERAPEUTIC EXERCISES: CPT

## 2024-12-04 ENCOUNTER — OFFICE VISIT (OUTPATIENT)
Dept: PHYSICAL THERAPY | Facility: CLINIC | Age: 29
End: 2024-12-04
Payer: COMMERCIAL

## 2024-12-04 DIAGNOSIS — Z3A.15 15 WEEKS GESTATION OF PREGNANCY: ICD-10-CM

## 2024-12-04 DIAGNOSIS — M62.89 PELVIC FLOOR DYSFUNCTION IN FEMALE: ICD-10-CM

## 2024-12-04 DIAGNOSIS — N81.89 PELVIC FLOOR WEAKNESS: Primary | ICD-10-CM

## 2024-12-04 DIAGNOSIS — R32 URINARY INCONTINENCE IN FEMALE: ICD-10-CM

## 2024-12-04 PROCEDURE — 97112 NEUROMUSCULAR REEDUCATION: CPT

## 2024-12-04 PROCEDURE — 97140 MANUAL THERAPY 1/> REGIONS: CPT

## 2024-12-04 PROCEDURE — 97110 THERAPEUTIC EXERCISES: CPT

## 2024-12-04 NOTE — PROGRESS NOTES
"Daily Note     Today's date: 2024  Patient name: Greer Lu  : 1995  MRN: [id number]  Referring provider: Amina Arshad CRNP  Dx:   Encounter Diagnosis     ICD-10-CM    1. Pelvic floor weakness  N81.89       2. 15 weeks gestation of pregnancy  Z3A.15       3. Pelvic floor dysfunction in female  M62.89       4. Urinary incontinence in female  R32           Start Time: 932  Stop Time: 101  Total time in clinic (min): 43 minutes    Subjective: The pt reports that she is \"thoroughly wiped out\" from this past week. She traveled and went camping this past weekend. She notes she did not do many stretches this past week. \"I really dropped the ball.\" -- Regarding the mattress while camping, \"it really was not that bad.\" -- Pt notes \"a lot of clicking\" in the back part of her hips while walking. -- \"Some days are fine and other days are not.\"      Objective: See treatment diary below      Assessment: The pt participated in a skilled physical therapy session that focused on neuromuscular re-education, therapeutic exercise, and manual intervention. NR interventions focused on addressing impairments in neuromuscular control/strength of pelvic/hip musculature necessary for pelvic stability. She presented with localized muscle tenderness to the left of the coccyx, which was addressed with manual intervention. She tolerated treatment well. The pt would benefit from continued PT to address impairments in order to improve her quality of life, symptom management, and bladder function.       Plan: Continue per plan of care.      Precautions: -- CURRENTLY PREGNANT (Due: 3/7/2025) -- PMH includes: Nephrolithiasis, Allergic rhinitis, High risk for Breast Cancer, GERD, Kidney Stone (2018)               Manuals             STM/MFR  KS (hip adductors; PFM; posterior pelvis) KS (hip adductors)  KS ( posterior pelvis; lumbar)        Fascial Mobilization  KS (anterior pelvis) KS (anterior " "pelvis)                                    Neuro Re-Ed             PPT  10x w/ 5\" hold 2x10 w/ 5\" hold           Reverse clamshell 10x ea LE            PPT + Iso Hip ABD  10x w/ 5\" hold           PPT + Bent Knee Fallout   Red TB 2x10 ea LE  Red TB 2x10 ea LE        Cat-cow    10x ea         Standing Hip extension    10x ea LE         Standing Hip Flex    10x ea LE         Glute Sets: Seated    10x w/ 5\" hold 2x10 w/ 5\" hold        Iso Hip ABD: Seated    10x w/ 5\" hold         Iso Hip ADD: Seated     2x10 w/ 5\" hold        Bridge w/ Resistance     Red TB 2x10        PPT + SLR     10x ea LE                     Ther Ex             Butterfly Stretch: Supine 1x30\"  2x30\"          Happy Baby Stretch: Seated 1x30\" 2x30\" 1x30\"          Happy Baby Stretch: Supine     1x30\"        Standing Hip Adductor Stretch   Pt edu for HEP          Bruce pose    10x w/ 10\" hold         Figure Four Stretch     Provoked L pelvic click                                  POC; PF Physical Therapy KS            Address pt questions PRN KS KS KS KS KS        Anatomy as it relates to pt's symptoms/presentation KS KS KS KS KS        HEP KS KS KS KS KS        Update on pt's symptoms/status  KS KS KS KS                     Ther Activity             Core brace for transfers  KS           Lumbar Roll/back support     KS         Sitting posture    KS                      Gait Training                                       Modalities                                                  "

## 2024-12-11 ENCOUNTER — OFFICE VISIT (OUTPATIENT)
Dept: PHYSICAL THERAPY | Facility: CLINIC | Age: 29
End: 2024-12-11
Payer: COMMERCIAL

## 2024-12-11 DIAGNOSIS — Z3A.15 15 WEEKS GESTATION OF PREGNANCY: ICD-10-CM

## 2024-12-11 DIAGNOSIS — R32 URINARY INCONTINENCE IN FEMALE: ICD-10-CM

## 2024-12-11 DIAGNOSIS — M62.89 PELVIC FLOOR DYSFUNCTION IN FEMALE: ICD-10-CM

## 2024-12-11 DIAGNOSIS — N81.89 PELVIC FLOOR WEAKNESS: Primary | ICD-10-CM

## 2024-12-11 PROCEDURE — 97140 MANUAL THERAPY 1/> REGIONS: CPT

## 2024-12-11 PROCEDURE — 97110 THERAPEUTIC EXERCISES: CPT

## 2024-12-11 PROCEDURE — 97112 NEUROMUSCULAR REEDUCATION: CPT

## 2024-12-11 NOTE — PROGRESS NOTES
Daily Note     Today's date: 2024  Patient name: Greer Lu  : 1995  MRN: [id number]  Referring provider: Amina Arshad CRNP  Dx:   Encounter Diagnosis     ICD-10-CM    1. Pelvic floor weakness  N81.89       2. 15 weeks gestation of pregnancy  Z3A.15       3. Pelvic floor dysfunction in female  M62.89       4. Urinary incontinence in female  R32           Start Time: 935  Stop Time: 1015  Total time in clinic (min): 40 minutes    Subjective: The pt reports she feels no change in her symptoms; however they are not getting worse as it did with her last pregnancy. Pt notes her   pelvic pain is the worse when she is in a position for along period of time and then moves - example laying in bed for awhile and then changing position. Pt notes right after she voids will leak post dribble or after she walks away from toilet she will leak.               Objective: See treatment diary below      Assessment: Patient presents at 27 weeks gestation of pregnancy.  Initiated manual therapy to improve myofascial mobility, decrease pain, and optimize muscle activation by reducing inhibition from pain or tissue restrictions- TTP noted to  L> R coccyx and glute region today.  Focus on NMR interventions with proximal hip strengthening to increase lumbar and pelvic stability with good tolerance to activity. Patient educated on post dribble strategies to help minimize leaking post void using 3 quick flicks right after she voids, followed by 3 more quick flicks upon rising from toilet- assess outcome for nv.    The pt would benefit from continued PT to address impairments in order to improve her quality of life, symptom management, and bladder function.       Plan: Continue per plan of care.      Precautions: -- CURRENTLY PREGNANT (Due: 3/7/2025) -- PMH includes: Nephrolithiasis, Allergic rhinitis, High risk for Breast Cancer, GERD, Kidney Stone (2018)              Manuals            "  STM/MFR  KS (hip adductors; PFM; posterior pelvis) KS (hip adductors)  KS ( posterior pelvis; lumbar) JM ( posterior pelvis; lumbar)       Fascial Mobilization  KS (anterior pelvis) KS (anterior pelvis)                                    Neuro Re-Ed             PPT  10x w/ 5\" hold 2x10 w/ 5\" hold           Reverse clamshell 10x ea LE            PPT + Iso Hip ABD  10x w/ 5\" hold           PPT + Bent Knee Fallout   Red TB 2x10 ea LE  Red TB 2x10 ea LE Red TB 2x10 ea LE       Cat-cow    10x ea         Standing Hip extension    10x ea LE         Standing Hip Flex    10x ea LE         Glute Sets: Seated    10x w/ 5\" hold 2x10 w/ 5\" hold 2x10 w/ 5\" hold       Iso Hip ABD: Seated    10x w/ 5\" hold         Iso Hip ADD: Seated     2x10 w/ 5\" hold 2x10 w/ 5\" hold       Bridge w/ Resistance     Red TB 2x10 Red TB 2x10       PPT + SLR     10x ea LE 20x ea LE                    Ther Ex             Butterfly Stretch: Supine 1x30\"  2x30\"          Happy Baby Stretch: Seated 1x30\" 2x30\" 1x30\"          Happy Baby Stretch: Supine     1x30\" 1x30\"       Standing Hip Adductor Stretch   Pt edu for HEP          Bruce pose    10x w/ 10\" hold         Figure Four Stretch     Provoked L pelvic click                                  POC; PF Physical Therapy KS            Address pt questions PRN KS KS KS KS KS        Anatomy as it relates to pt's symptoms/presentation KS KS KS KS KS        HEP KS KS KS KS KS        Update on pt's symptoms/status  KS KS KS KS                     Ther Activity             Core brace for transfers  KS           Lumbar Roll/back support     KS         Sitting posture    KS                      Gait Training                                       Modalities                                                  "

## 2024-12-12 ENCOUNTER — ROUTINE PRENATAL (OUTPATIENT)
Dept: OBGYN CLINIC | Facility: CLINIC | Age: 29
End: 2024-12-12
Payer: COMMERCIAL

## 2024-12-12 VITALS — WEIGHT: 184.2 LBS | SYSTOLIC BLOOD PRESSURE: 110 MMHG | BODY MASS INDEX: 31.62 KG/M2 | DIASTOLIC BLOOD PRESSURE: 54 MMHG

## 2024-12-12 DIAGNOSIS — Z34.82 PRENATAL CARE, SUBSEQUENT PREGNANCY, SECOND TRIMESTER: ICD-10-CM

## 2024-12-12 DIAGNOSIS — O99.211 OBESITY AFFECTING PREGNANCY IN FIRST TRIMESTER, UNSPECIFIED OBESITY TYPE: ICD-10-CM

## 2024-12-12 DIAGNOSIS — N81.89 PELVIC FLOOR WEAKNESS: ICD-10-CM

## 2024-12-12 DIAGNOSIS — Z3A.27 27 WEEKS GESTATION OF PREGNANCY: Primary | ICD-10-CM

## 2024-12-12 LAB
SL AMB  POCT GLUCOSE, UA: NEGATIVE
SL AMB POCT URINE PROTEIN: NEGATIVE

## 2024-12-12 PROCEDURE — 81002 URINALYSIS NONAUTO W/O SCOPE: CPT | Performed by: NURSE PRACTITIONER

## 2024-12-12 PROCEDURE — PNV: Performed by: NURSE PRACTITIONER

## 2024-12-12 NOTE — PATIENT INSTRUCTIONS
Please call the office if you feel that you are having contractions, leaking of fluid, vaginal bleeding, or if you are concerned about baby's movements.

## 2024-12-12 NOTE — PROGRESS NOTES
----- Message from Danette Maldonado sent at 11/30/2023 11:25 AM CST -----  Contact: Rosario   Patient calling for status on Urine Lab orders. Please call to advise     Routine Prenatal Visit  [facility] [place]  [address] [place] [address]    Assessment/Plan:  Greer is a 29 y.o. year old  at 27w6d who presents for routine prenatal visit.     1. 27 weeks gestation of pregnancy  Assessment & Plan:  Declines flu vaccine  Discussed timing of third trimester labs  Call with any concerns  Orders:  -     POCT urine dip  2. BMI 30  3. Prenatal care, subsequent pregnancy, second trimester  4. Pelvic floor weakness  Assessment & Plan:  Seeing PT      Next OB Visit 2 weeks.    Subjective:     CC: Prenatal care    Greer Lu is a 29 y.o.  female who presents for routine prenatal care at 27w6d. Pt feels well and has no complaints.  Pregnancy ROS: no leakage of fluid, pelvic pain, or vaginal bleeding.  normal fetal movement.    The following portions of the patient's history were reviewed and updated as appropriate: allergies, current medications, past family history, past medical history, obstetric history, gynecologic history, past social history, past surgical history and problem list.      Objective:  /54   Wt 83.6 kg (184 lb 3.2 oz)   LMP 2024 (Exact Date)   BMI 31.62 kg/m²   Pregravid Weight/BMI: 78 kg (172 lb) (BMI 29.51)  Current Weight: 83.6 kg (184 lb 3.2 oz)   Total Weight Gain: 5.534 kg (12 lb 3.2 oz)   Pre-Renuka Vitals      Flowsheet Row Most Recent Value   Prenatal Assessment    Fetal Heart Rate 145   Fundal Height (cm) 27 cm   Movement Present   Prenatal Vitals    Blood Pressure 110/54   Weight - Scale 83.6 kg (184 lb 3.2 oz)   Urine Albumin/Glucose    Dilation/Effacement/Station    Vaginal Drainage    Draining Fluid No   Edema    LLE Edema None   RLE Edema None             General: Well appearing, no distress  Abdomen: Soft, gravid, nontender  Extremities: Non tender.

## 2024-12-16 ENCOUNTER — NURSE TRIAGE (OUTPATIENT)
Age: 29
End: 2024-12-16

## 2024-12-16 NOTE — TELEPHONE ENCOUNTER
"Pt is 28w3d calling with onset of rash under both armpits since last week. Pt denies changing deodorant or laundry detergent recently (changed months ago, but not the last week). States rash can become very itchy, and varies from day to day. Notes rash has larger, puffy spots. Rash is localized to both armpits only. Pt denies fever, or other symptoms. Denies contractions, LOF, vaginal bleeding. Endorses positive fetal movement. RN recommended applying topical Benadryl or Hydrocortisone cream for itch relief. Advised can take oral Benadryl for overall systemic rash/allergy relief, especially if symptoms are very bothersome at bedtime. May make pt drowsy, but safe in pregnancy. Informed rash under armpits may be due to changes in pH and increase in sweating in pregnancy. Advised continue to monitor. If symptoms do not improve with above interventions, or rash begins to spread, especially around face, neck, mouth, or pt develops a fever - call office back. Pt agreeable to plan. No further questions.     Reason for Disposition  • Mild localized rash    Answer Assessment - Initial Assessment Questions  1. APPEARANCE of RASH: \"Describe the rash.\"       Big, puffy  2. LOCATION: \"Where is the rash located?\"       Bilateral under armpits  3. NUMBER: \"How many spots are there?\"       Multiple  4. SIZE: \"How big are the spots?\" (Inches, centimeters or compare to size of a coin)       Bigger spots  5. ONSET: \"When did the rash start?\"       Last week  6. ITCHING: \"Does the rash itch?\" If Yes, ask: \"How bad is the itch?\"  (Scale 0-10; or none, mild, moderate, severe)      Very itchy - varies  7. PAIN: \"Does the rash hurt?\" If Yes, ask: \"How bad is the pain?\"  (Scale 0-10; or none, mild, moderate, severe)      Denies  8. OTHER SYMPTOMS: \"Do you have any other symptoms?\" (e.g., fever)      Denies  9. PREGNANCY: \"Is there any chance you are pregnant?\" \"When was your last menstrual period?\"      28w3d    Protocols used: Rash or " Redness - Localized-Adult-[place]

## 2024-12-16 NOTE — TELEPHONE ENCOUNTER
Discussed provider note with patient.  Pt verbalized understanding, no further questions or concerns at this time.

## 2024-12-18 ENCOUNTER — OFFICE VISIT (OUTPATIENT)
Dept: PHYSICAL THERAPY | Facility: CLINIC | Age: 29
End: 2024-12-18
Payer: COMMERCIAL

## 2024-12-18 DIAGNOSIS — N81.89 PELVIC FLOOR WEAKNESS: Primary | ICD-10-CM

## 2024-12-18 DIAGNOSIS — R32 URINARY INCONTINENCE IN FEMALE: ICD-10-CM

## 2024-12-18 DIAGNOSIS — Z3A.15 15 WEEKS GESTATION OF PREGNANCY: ICD-10-CM

## 2024-12-18 DIAGNOSIS — M62.89 PELVIC FLOOR DYSFUNCTION IN FEMALE: ICD-10-CM

## 2024-12-18 PROCEDURE — 97110 THERAPEUTIC EXERCISES: CPT

## 2024-12-18 PROCEDURE — 97530 THERAPEUTIC ACTIVITIES: CPT

## 2024-12-18 NOTE — PROGRESS NOTES
PT Discharge    Today's date: 2024  Patient name: Greer Lu  : 1995  MRN: [id number]  Referring provider: Amina Arshad CRNP  Dx:   Encounter Diagnosis     ICD-10-CM    1. Pelvic floor weakness  N81.89       2. 15 weeks gestation of pregnancy  Z3A.15       3. Pelvic floor dysfunction in female  M62.89       4. Urinary incontinence in female  R32           Start Time: 932  Stop Time: 1015  Total time in clinic (min): 43 minutes    Assessment    Assessment details: The pt is a 29 year old female who pregnant and presents to skilled physical therapy services due to a decline in functional status related to pelvic pain and urinary incontinence. At this point in her POC, she has received 7 skilled therapy sessions. Upon completion of the CA, she presents with improvements in symptom severity/frequency as well as neuromuscular control/strength/endurance of PF musculature as noted by subjective report and objective measures (e.g., less quantity of urinary leakage). A 15 point decrease in her PFDI 20 (IE: 24; CA: 9) further reflects progress in pelvic floor function. Considering the pt's progress and her self-confidence with going forward independently, the pt will be discharged from her current POC and transitioned to an independent HEP for long-term management of her symptoms and pelvic floor function. She was educated to contact the clinic with any follow-up questions and/or changes in functional status. The pt verbalized good understanding.    Thank you for there referral.    Barriers to therapy: CURRENTLY PREGNANT (Due: 3/7/2025) -- PMH includes: Nephrolithiasis, Allergic rhinitis, High risk for Breast Cancer, GERD, Kidney Stone (2018)  Barriers to intervention: medical complexity  Understanding of Dx/Px/POC: good     Prognosis: good    Goals  - Pt will have decreased lumbopelvic muscle overactivity to mild-trace to reflect a reduction in spasms in order to improve functional abilities.    "(PROGRESSING)  - Pt will be independent with HEP, with proper demonstration and understanding for long-term management of functional abilities and quality of life.   (PROGRESSING)  - Pt will be knowledgeable of postural strategies to optimize toileting techniques.    (PROGRESSING)  - Pt is able to cough, laugh, and sneeze without bladder leakage in order to improve quality of life and decrease need/dependency on panty liners.   (PROGRESSING)  - Pt reports at least a 75% reduction in urinary incontinence indicating decreased effect of urinary incontinence on function.    (PROGRESSING)  - Pt reports improved fluid intake for reduced bladder urgency/frequency.     (PROGRESSING)  - Pt has implemented urge suppression strategies throughout the day and reports average voiding interval is 2-3 hours upon discharge in order to have more functional bladder functioning.    (PROGRESSING)      Plan  Patient would benefit from: skilled physical therapy  Referral necessary: Yes  Planned modality interventions: thermotherapy: hydrocollator packs    Planned therapy interventions: abdominal trunk stabilization, joint mobilization, manual therapy, body mechanics training, neuromuscular re-education, nerve gliding, breathing training, patient/caregiver education, self care, flexibility, gait training, therapeutic exercise, strengthening, therapeutic activities, stretching and home exercise program    Frequency: 1x week  Plan of Care beginning date: 11/6/2024  Plan of Care expiration date: 1/10/2025  Treatment plan discussed with: patient      PT Pelvic Floor Subjective:   History of Present Illness:     PROGRESS REPORT: 12/18/2024  Pt reports that doing 3 quick Kegel's after wiping and when standing after urinating. \"That has helped make less issues in the liner later.\" -- Pt confirms that she feels that she is better able to manage her symptoms.  -- \"I would definitely say that this has been helpful.\" \"I do feel like I have a little " "bit of core muscles.\" -- Some anterior pelvic pain. Pt notes that some days it feels better.   INITIAL EVALUATION:  The pt reports to skilled physical therapy to address impairments in pelvic pain and urinary incontinences during her pregnancy. Pt is currently pregnant with her 2nd child. Pt reports a progressive increase in urinary leakage and difficulty emptying her bladder over the course of her pregnancy. Pt also reports an onset of anterior pelvic pain within the past month. -- Pt also reports \"sciatica\" in her left leg    During his first pregnancy, she experienced a little urinary leakage, but more occurred after labor/birthing. She reports tearing anteriorly during vaginal delivery. 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; on feet for work    Not exercising due to: lack of time and bladder incontinence  OB/ gyn History    Gestational History:     Prior Pregnancy: Yes      Number of prior pregnancies: 2    Number of term pregnancies: 1    Delivery Type: vaginal delivery      Number of vaginal deliveries: 1    Delivery Complications:  Tearing anteriorly; treated with stitches; slow healing    Menstrual History:      Tolerates tampons: yes (during menstrual cycle)  no hormone replacement therapy  Bladder Function:     Voiding Difficulties positive for: urgency, frequent urination and incomplete emptying (WA: Progress since 12/11 appointment)       Voiding Difficulties comments:     Voiding frequency: every 1-2 hours and every 31-60 minutes    Urinary leakage: urine leakage    Urinary leakage " "aggravated by: coughing, sneezing, exercise, standing up, post-void dribble (AZ: Progress) and laughing    Urinary leakage not aggravated by: bending and walking to the bathroom    Nocturia (episodes per night): 1    Painful urination: No      Fluid Intake Type:  Water, coffee and juice    Intake (ounces): Juice intake (oz): Occasional apple or orange juice. Coffee intake (oz): 1 cup in morning.   Incontinence Management:     Pads/Diaper Use:  Day    Pads/Diapers Additional Comments: Thin liner  Bowel Function:     Voiding DIfficulties: constipation      Bowel Function comments:  AZ:Regarding bowel movements, pt reports: \"They vary all over the place. Pregnancy poops.\"    Bowel frequency: daily and every 2 days    Stool softener use: no stool softeners    Uses \"squatty potty\": no Squatty Potty  Sexual Function:     Sexually Active:  Sexually active    Pain during intercourse: No    Pain:     Current pain ratin    At best pain ratin    At worst pain ratin    Location:  Anterior pelvic pain    Onset:  1-3 months ago    Quality:  Dull ache    Aggravating factors:  Sit to stand transition and prolonged positions (Lying down with uneven pelvis (left side-lying on couch with upper body elevated); supine to sit/stand transfers)    Relieving factors:  Change in position  Treatments:     Current treatment: physical therapy    Patient Goals:     Patient goals for therapy:  Improved bladder or bowel function, fully empty bladder or bowels, decreased pain, improved pain management, improved quality of life, improved comfort and relaxation      Objective     Static Posture     Shoulders  Rounded.    Palpation   Left   Tenderness of the adductor brevis, adductor longus, gluteus ligia, gluteus medius and piriformis.     Right   Tenderness of the adductor brevis, adductor longus, gluteus ligia, gluteus medius and piriformis.     Tenderness     Left Hip   Tenderness in the sacroiliac joint.     Right Hip "   Tenderness in the sacroiliac joint.     Strength/Myotome Testing     Left Hip   Planes of Motion   Flexion: 4    Right Hip   Planes of Motion   Flexion: 4+    Ambulation   Weight-Bearing Status   Weight-Bearing Status (Left): full weight bearing   Weight-Bearing Status (Right): full weight-bearing    Assistive device used: none      Abdominal Assessment:          General Perineum Exam:     General perineum exam comments:   MN/DC: Assessment of perineum and internal pelvic floor held as pt is pregnant. Increased focus on pt education.    INITIAL EVALUATION: Assessment of perineum and internal pelvic floor musculature was held as the pt is currently 22 weeks pregnant. Increased focused on pt's subjective report and pt education.    Visual Inspection of Perineum:   PFM Contraction Comments:   MN/DC: Assessment of perineum and internal pelvic floor held as pt is pregnant. Increased focus on pt education.    INITIAL EVALUATION: Assessment of perineum and internal pelvic floor musculature was held as the pt is currently 22 weeks pregnant. Increased focused on pt's subjective report and pt education.    Pelvic Organ Prolapse   Comments:   MN/DC: Assessment of perineum and internal pelvic floor held as pt is pregnant. Increased focus on pt education.    INITIAL EVALUATION: Assessment of perineum and internal pelvic floor musculature was held as the pt is currently 22 weeks pregnant. Increased focused on pt's subjective report and pt education.        Pelvic Floor Muscle Exam:             pelvic floor exam consent given by patient                  Precautions: -- CURRENTLY PREGNANT (Due: 3/7/2025) -- PMH includes: Nephrolithiasis, Allergic rhinitis, High risk for Breast Cancer, GERD, Kidney Stone (2018)       11/6 11/13 11/20 11/27 12/4 12/11 12/18      Manuals             STM/MFR  KS (hip adductors; PFM; posterior pelvis) KS (hip adductors)  KS ( posterior pelvis; lumbar) JM ( posterior pelvis; lumbar)       Fascial  "Mobilization  KS (anterior pelvis) KS (anterior pelvis)                                    Neuro Re-Ed             PPT  10x w/ 5\" hold 2x10 w/ 5\" hold           Reverse clamshell 10x ea LE            PPT + Iso Hip ABD  10x w/ 5\" hold           PPT + Bent Knee Fallout   Red TB 2x10 ea LE  Red TB 2x10 ea LE Red TB 2x10 ea LE       Cat-cow    10x ea         Standing Hip extension    10x ea LE         Standing Hip Flex    10x ea LE         Glute Sets: Seated    10x w/ 5\" hold 2x10 w/ 5\" hold 2x10 w/ 5\" hold       Iso Hip ABD: Seated    10x w/ 5\" hold         Iso Hip ADD: Seated     2x10 w/ 5\" hold 2x10 w/ 5\" hold       Bridge w/ Resistance     Red TB 2x10 Red TB 2x10       PPT + SLR     10x ea LE 20x ea LE                    Ther Ex             Butterfly Stretch: Supine 1x30\"  2x30\"          Happy Baby Stretch: Seated 1x30\" 2x30\" 1x30\"          Happy Baby Stretch: Supine     1x30\" 1x30\"       Standing Hip Adductor Stretch   Pt edu for HEP          Bruce pose    10x w/ 10\" hold         Figure Four Stretch     Provoked L pelvic click                                  POC; PF Physical Therapy KS      KS      Address pt questions PRN KS KS KS KS KS  KS      Anatomy as it relates to pt's symptoms/presentation KS KS KS KS KS  KS      HEP KS KS KS KS KS  KS      Update on pt's symptoms/status  KS KS KS KS  KS                   Ther Activity             Core brace for transfers  KS     KS (add gluteal  to improve pelvic stability)      Lumbar Roll/back support     KS         Sitting posture    KS         PFDI 20       KS      Pelvic Girdle Questionnaire       KS      NV: Update on pt's symptoms/status/function       KS      Labor/Birthing Positions       KS      Perineal Massage as prep for vaginal birth       KS                   Gait Training                                       Modalities                                                     "

## 2024-12-23 PROBLEM — Z3A.29 29 WEEKS GESTATION OF PREGNANCY: Status: ACTIVE | Noted: 2024-08-22

## 2024-12-24 ENCOUNTER — ROUTINE PRENATAL (OUTPATIENT)
Dept: OBGYN CLINIC | Facility: CLINIC | Age: 29
End: 2024-12-24
Payer: COMMERCIAL

## 2024-12-24 VITALS
WEIGHT: 186 LBS | SYSTOLIC BLOOD PRESSURE: 128 MMHG | BODY MASS INDEX: 31.76 KG/M2 | HEIGHT: 64 IN | DIASTOLIC BLOOD PRESSURE: 62 MMHG

## 2024-12-24 DIAGNOSIS — Z3A.29 29 WEEKS GESTATION OF PREGNANCY: ICD-10-CM

## 2024-12-24 DIAGNOSIS — O99.211 OBESITY AFFECTING PREGNANCY IN FIRST TRIMESTER, UNSPECIFIED OBESITY TYPE: ICD-10-CM

## 2024-12-24 DIAGNOSIS — N81.89 PELVIC FLOOR WEAKNESS: ICD-10-CM

## 2024-12-24 DIAGNOSIS — Z34.82 PRENATAL CARE, SUBSEQUENT PREGNANCY, SECOND TRIMESTER: Primary | ICD-10-CM

## 2024-12-24 LAB
SL AMB  POCT GLUCOSE, UA: ABNORMAL
SL AMB POCT URINE PROTEIN: ABNORMAL

## 2024-12-24 PROCEDURE — PNV: Performed by: PHYSICIAN ASSISTANT

## 2024-12-24 PROCEDURE — 81002 URINALYSIS NONAUTO W/O SCOPE: CPT | Performed by: PHYSICIAN ASSISTANT

## 2024-12-24 NOTE — PROGRESS NOTES
"Routine Prenatal Visit  [facility] - [address], [address], [place] [address]    Assessment/Plan:  Greer is a 29 y.o. year old  at 29w3d who presents for routine prenatal visit.     1. Prenatal care, subsequent pregnancy, second trimester  2. 29 weeks gestation of pregnancy  Assessment & Plan:  28 week labs reprinted for patient.   Reviewed Tdap and RSV vaccine. Will consider Tdap next visit.   28 week packet given. Reviewed fetal kick counts, breast pump, forms to bring back, pediatricians, and perineal massage.   Continue routine prenatal care.   Return to office for ob check in 2 weeks.       Orders:  -     POCT urine dip  3. Pelvic floor weakness  Assessment & Plan:  Continue to follow closely with pelvic floor PT  4. BMI 30      Next OB Visit 2 weeks.    Subjective:     CC: Prenatal care    Greer Lu is a 29 y.o.  female who presents for routine prenatal care at 29w3d.  Pregnancy ROS: Good Fm, some nausea this week. No N/V, HA, cramping, VB, LOF, edema, domestic violence, or smoking. Tolerating PNV.        The following portions of the patient's history were reviewed and updated as appropriate: allergies, current medications, past family history, past medical history, obstetric history, gynecologic history, past social history, past surgical history and problem list.      Objective:  /62 (BP Location: Right arm, Patient Position: Sitting, Cuff Size: Standard)   Ht 5' 4\" (1.626 m)   Wt 84.4 kg (186 lb)   LMP 2024 (Exact Date)   BMI 31.93 kg/m²   Pregravid Weight/BMI: 78 kg (172 lb) (BMI 29.51)  Current Weight: 84.4 kg (186 lb)   Total Weight Gain: 6.35 kg (14 lb)   Pre-Renuka Vitals      Flowsheet Row Most Recent Value   Prenatal Assessment    Fetal Heart Rate 145   Fundal Height (cm) 29 cm   Movement Present   Prenatal Vitals    Blood Pressure 128/62   Weight - Scale 84.4 kg (186 lb)   Urine Albumin/Glucose    Dilation/Effacement/Station    Vaginal " Drainage    Edema    LLE Edema None   RLE Edema None   Facial Edema None             General: Well appearing, no distress  Abdomen: Soft, gravid, nontender  Fundal Height: Appropriate for gestational age.  Extremities: Warm and well perfused.  Non tender.

## 2024-12-24 NOTE — ASSESSMENT & PLAN NOTE
28 week labs reprinted for patient.   Reviewed Tdap and RSV vaccine. Will consider Tdap next visit.   28 week packet given. Reviewed fetal kick counts, breast pump, forms to bring back, pediatricians, and perineal massage.   Continue routine prenatal care.   Return to office for ob check in 2 weeks.

## 2025-01-02 ENCOUNTER — PATIENT MESSAGE (OUTPATIENT)
Dept: OBGYN CLINIC | Facility: CLINIC | Age: 30
End: 2025-01-02

## 2025-01-02 ENCOUNTER — NURSE TRIAGE (OUTPATIENT)
Age: 30
End: 2025-01-02

## 2025-01-02 LAB
EXTERNAL HEMOGLOBIN: 12.3 G/DL
EXTERNAL PLATELET COUNT: 141 K/ΜL
EXTERNAL SYPHILIS TOTAL IGG/IGM SCREENING: NORMAL

## 2025-01-02 NOTE — TELEPHONE ENCOUNTER
Regarding: feeling shaky after glucose test  ----- Message from Eli NELSON sent at 1/2/2025  1:49 PM EST -----  Pt sent a myc message that she is feeling shaky at work after her glucose test this morning. She is eating and drinking okay.

## 2025-01-02 NOTE — TELEPHONE ENCOUNTER
Reason for Disposition  • Health information question, no triage required and triager able to answer question    Answer Assessment - Initial Assessment Questions  Return call from Greer, she no longer is having symptoms. Has rehydrated and ate this afternoon after glucose testing which resolved her concerns.    Advised continue to hydrate, protein dinner to help break down sugars    Protocols used: Information Only [facility]

## 2025-01-03 ENCOUNTER — NURSE TRIAGE (OUTPATIENT)
Age: 30
End: 2025-01-03

## 2025-01-03 ENCOUNTER — RESULTS FOLLOW-UP (OUTPATIENT)
Dept: OBGYN CLINIC | Facility: CLINIC | Age: 30
End: 2025-01-03

## 2025-01-03 ENCOUNTER — HOSPITAL ENCOUNTER (INPATIENT)
Facility: HOSPITAL | Age: 30
LOS: 1 days | Discharge: HOME | End: 2025-01-03
Attending: OBSTETRICS & GYNECOLOGY | Admitting: OBSTETRICS & GYNECOLOGY
Payer: COMMERCIAL

## 2025-01-03 ENCOUNTER — TELEPHONE (OUTPATIENT)
Age: 30
End: 2025-01-03

## 2025-01-03 VITALS
DIASTOLIC BLOOD PRESSURE: 68 MMHG | HEART RATE: 84 BPM | TEMPERATURE: 98.2 F | OXYGEN SATURATION: 97 % | RESPIRATION RATE: 20 BRPM | SYSTOLIC BLOOD PRESSURE: 124 MMHG

## 2025-01-03 DIAGNOSIS — R73.09 ABNORMAL GLUCOSE TOLERANCE TEST: Primary | ICD-10-CM

## 2025-01-03 DIAGNOSIS — O99.810 ABNORMAL GLUCOSE TOLERANCE TEST (GTT) DURING PREGNANCY, ANTEPARTUM: ICD-10-CM

## 2025-01-03 PROBLEM — O36.8130 DECREASED FETAL MOVEMENTS IN THIRD TRIMESTER: Status: ACTIVE | Noted: 2025-01-03

## 2025-01-03 LAB
ERYTHROCYTE [DISTWIDTH] IN BLOOD BY AUTOMATED COUNT: 12.7 % (ref 11–15)
GLUCOSE 1H P 50 G GLC PO SERPL-MCNC: 140 MG/DL
HCT VFR BLD AUTO: 37 % (ref 35–45)
HGB BLD-MCNC: 12.3 G/DL (ref 11.7–15.5)
MCH RBC QN AUTO: 30.1 PG (ref 27–33)
MCHC RBC AUTO-ENTMCNC: 33.2 G/DL (ref 32–36)
MCV RBC AUTO: 90.7 FL (ref 80–100)
PLATELET # BLD AUTO: 141 THOUSAND/UL (ref 140–400)
PMV BLD REES-ECKER: 12 FL (ref 7.5–12.5)
RBC # BLD AUTO: 4.08 MILLION/UL (ref 3.8–5.1)
RPR SER QL: NORMAL
WBC # BLD AUTO: 6.7 THOUSAND/UL (ref 3.8–10.8)

## 2025-01-03 PROCEDURE — NC001 PR NO CHARGE: Performed by: OBSTETRICS & GYNECOLOGY

## 2025-01-03 PROCEDURE — 99212 OFFICE O/P EST SF 10 MIN: CPT

## 2025-01-03 PROCEDURE — 59025 FETAL NON-STRESS TEST: CPT | Performed by: OBSTETRICS & GYNECOLOGY

## 2025-01-03 RX ORDER — FAMOTIDINE 10 MG
10 TABLET ORAL
COMMUNITY

## 2025-01-03 NOTE — PROCEDURES
Greer Lu, a  at 31w0d with an SURINDER of 3/7/2025, by Ultrasound, was seen at [facility] beds at the [facility] DEL[facility]ERY for the following procedure(s): $Procedure Type: NST]    Nonstress Test  Reason for NST: Other (Comment)  Variability: Moderate  Decelerations: None  Accelerations: Yes  Acoustic Stimulator: No  Uterine Irritability: No  Contractions: Not present                   Interpretation  Nonstress Test Interpretation: Reactive  Overall Impression: Reassuring
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
## 2025-01-03 NOTE — ASSESSMENT & PLAN NOTE
-NST reactive  - Precautions given to call with pain/contractions, LOF, bleeding or decreased FM  - Discharge to [facility] with outpt follow up as scheduled
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
## 2025-01-03 NOTE — TELEPHONE ENCOUNTER
Patient calling in stating that she saw a test result from 12/24/24 and it was for the urine testing and it was flagged as abnormal with trace protein in the urine. Pt is requesting further recommendations.

## 2025-01-07 PROBLEM — Z3A.31 31 WEEKS GESTATION OF PREGNANCY: Status: ACTIVE | Noted: 2024-08-22

## 2025-01-07 NOTE — PROGRESS NOTES
"Routine Prenatal Visit  [facility] - [place]  [address] [place] [address]  Assessment & Plan  32 weeks gestation of pregnancy  - Labs up to date   - Genetic testing deferred   - Unremarkable level II US   - Vaccinations:   Declined flu vaccine   Declined RSV   Still thinking about Tdap   - RTC for 34 week OB visit     Orders:    POCT urine dip    Nephrolithiasis  - Hx outside of pregnancy        Abnormal glucose tolerance test (GTT) during pregnancy, antepartum  - Abnormal 1 hr GTT  - Declines 3 hour GTT  - Amenable to BG checks at home, advised to check 4x daily (fasting and 1 hour after meals breakfast, lunch, dinner). Patient to send values via my chart for 1 week to be assessed   Orders:    Glucometer    Glucometer test strips    Lancets    Other obesity due to excess calories affecting pregnancy in first trimester  - BMI 32        Prenatal care, subsequent pregnancy, second trimester         Subjective:   Greer Lu is a 30 y.o.  who presents for routine prenatal care at 31w4d.  Complaints today: none    Patient is curious about circumcision for her baby. Will obtain information from Peds and relay to patient.     LOF: no; VB: no; Contractions: no; FM: yes     Objective:  /74 (BP Location: Left arm, Patient Position: Sitting, Cuff Size: Standard)   Ht 5' 4\" (1.626 m)   Wt 86.6 kg (191 lb)   LMP 2024 (Exact Date)   BMI 32.79 kg/m²     General: Well appearing, no distress  Respiratory: Unlabored breathing  Cardiovascular: Regular rate.  Abdomen: Soft, gravid, nontender  Extremities: Warm and well perfused.  Non tender.    Pregravid Weight/BMI: 78 kg (172 lb) (BMI 29.51)  Current Weight: 86.6 kg (191 lb)   Total Weight Gain: 8.618 kg (19 lb)     Pre- Vitals      Flowsheet Row Most Recent Value   Prenatal Assessment    Fetal Heart Rate 136   Fundal Height (cm) 32 cm   Prenatal Vitals    Blood Pressure 120/74   Weight - Scale 86.6 kg (191 lb)   Urine Albumin/Glucose "    Dilation/Effacement/Station    Vaginal Drainage    Edema              Leobardo Castlilo MD  1/8/2025 11:04 AM

## 2025-01-07 NOTE — ASSESSMENT & PLAN NOTE
- Abnormal 1 hr GTT  - Declines 3 hour GTT  - Amenable to BG checks at home, advised to check 4x daily (fasting and 1 hour after meals breakfast, lunch, dinner). Patient to send values via my chart for 1 week to be assessed   Orders:    Glucometer    Glucometer test strips    Lancets

## 2025-01-08 ENCOUNTER — ROUTINE PRENATAL (OUTPATIENT)
Dept: OBGYN CLINIC | Facility: CLINIC | Age: 30
End: 2025-01-08
Payer: COMMERCIAL

## 2025-01-08 VITALS
BODY MASS INDEX: 32.61 KG/M2 | WEIGHT: 191 LBS | SYSTOLIC BLOOD PRESSURE: 120 MMHG | DIASTOLIC BLOOD PRESSURE: 74 MMHG | HEIGHT: 64 IN

## 2025-01-08 DIAGNOSIS — O99.211 OTHER OBESITY DUE TO EXCESS CALORIES AFFECTING PREGNANCY IN FIRST TRIMESTER: ICD-10-CM

## 2025-01-08 DIAGNOSIS — O36.8130 DECREASED FETAL MOVEMENTS IN THIRD TRIMESTER, SINGLE OR UNSPECIFIED FETUS: ICD-10-CM

## 2025-01-08 DIAGNOSIS — E66.09 OTHER OBESITY DUE TO EXCESS CALORIES AFFECTING PREGNANCY IN FIRST TRIMESTER: ICD-10-CM

## 2025-01-08 DIAGNOSIS — O99.810 ABNORMAL GLUCOSE TOLERANCE TEST (GTT) DURING PREGNANCY, ANTEPARTUM: Primary | ICD-10-CM

## 2025-01-08 DIAGNOSIS — Z3A.31 31 WEEKS GESTATION OF PREGNANCY: ICD-10-CM

## 2025-01-08 DIAGNOSIS — N20.0 NEPHROLITHIASIS: ICD-10-CM

## 2025-01-08 DIAGNOSIS — Z34.82 PRENATAL CARE, SUBSEQUENT PREGNANCY, SECOND TRIMESTER: ICD-10-CM

## 2025-01-08 DIAGNOSIS — Z3A.32 32 WEEKS GESTATION OF PREGNANCY: ICD-10-CM

## 2025-01-08 LAB
SL AMB  POCT GLUCOSE, UA: NORMAL
SL AMB POCT URINE PROTEIN: NORMAL

## 2025-01-08 PROCEDURE — PNV: Performed by: STUDENT IN AN ORGANIZED HEALTH CARE EDUCATION/TRAINING PROGRAM

## 2025-01-08 PROCEDURE — 81002 URINALYSIS NONAUTO W/O SCOPE: CPT | Performed by: STUDENT IN AN ORGANIZED HEALTH CARE EDUCATION/TRAINING PROGRAM

## 2025-01-10 ENCOUNTER — TELEPHONE (OUTPATIENT)
Dept: OBGYN CLINIC | Facility: CLINIC | Age: 30
End: 2025-01-10

## 2025-01-10 DIAGNOSIS — O99.810 ABNORMAL GLUCOSE TOLERANCE TEST (GTT) DURING PREGNANCY, ANTEPARTUM: Primary | ICD-10-CM

## 2025-01-10 NOTE — TELEPHONE ENCOUNTER
1/10/25 LMOM for patient to  Prescriptions order at the [facility] anytime today between the hours of 7:30 am to 4:00 pm.    sharp

## 2025-01-13 ENCOUNTER — TELEPHONE (OUTPATIENT)
Dept: OBGYN CLINIC | Facility: CLINIC | Age: 30
End: 2025-01-13

## 2025-01-13 NOTE — TELEPHONE ENCOUNTER
3rd trimester call - 32 wks 3 days - left message patient's VM & [employer] message sent to patient 1/13/2025    Overall how are you feeling?     Compliant with routine OB appointments? yes    Have you completed your 3rd trimester lab work?   Yes - had elevated 1 hr glucose = 140, declined 3 hr GTT - checking bl sugars as dir x 1 wk    Have you reviewed the contents of 3rd trimester folder from office?   Have you decided on a pediatrician?     Questions on paperwork to go back to office?   Questions on the baby birth certificate and photography forms?       Sent link for the Hospital Readiness Video via [employer] 1/13/2025

## 2025-01-14 ENCOUNTER — TELEPHONE (OUTPATIENT)
Dept: OBGYN CLINIC | Facility: CLINIC | Age: 30
End: 2025-01-14

## 2025-01-14 LAB
DME PARACHUTE DELIVERY DATE REQUESTED: NORMAL
DME PARACHUTE ITEM DESCRIPTION: NORMAL
DME PARACHUTE ORDER STATUS: NORMAL
DME PARACHUTE SUPPLIER NAME: NORMAL
DME PARACHUTE SUPPLIER PHONE: NORMAL

## 2025-01-23 ENCOUNTER — ROUTINE PRENATAL (OUTPATIENT)
Dept: OBGYN CLINIC | Facility: CLINIC | Age: 30
End: 2025-01-23
Payer: COMMERCIAL

## 2025-01-23 VITALS
BODY MASS INDEX: 31.62 KG/M2 | HEIGHT: 64 IN | SYSTOLIC BLOOD PRESSURE: 124 MMHG | DIASTOLIC BLOOD PRESSURE: 62 MMHG | WEIGHT: 185.2 LBS

## 2025-01-23 DIAGNOSIS — O99.810 ABNORMAL GLUCOSE TOLERANCE TEST (GTT) DURING PREGNANCY, ANTEPARTUM: ICD-10-CM

## 2025-01-23 DIAGNOSIS — O99.211 OTHER OBESITY DUE TO EXCESS CALORIES AFFECTING PREGNANCY IN FIRST TRIMESTER: ICD-10-CM

## 2025-01-23 DIAGNOSIS — Z3A.33 33 WEEKS GESTATION OF PREGNANCY: Primary | ICD-10-CM

## 2025-01-23 DIAGNOSIS — E66.09 OTHER OBESITY DUE TO EXCESS CALORIES AFFECTING PREGNANCY IN FIRST TRIMESTER: ICD-10-CM

## 2025-01-23 LAB
SL AMB  POCT GLUCOSE, UA: NORMAL
SL AMB POCT URINE PROTEIN: NORMAL

## 2025-01-23 PROCEDURE — 81002 URINALYSIS NONAUTO W/O SCOPE: CPT | Performed by: NURSE PRACTITIONER

## 2025-01-23 PROCEDURE — PNV: Performed by: NURSE PRACTITIONER

## 2025-01-23 NOTE — ASSESSMENT & PLAN NOTE
Still thinking about TDAP  Declines RSV vaccine  Reviewed C, S/S PTL  Call with concerns   The following changes were made to your medications today:   Resume Midodrine 5 mg twice daily - 7AM and 3PM      The following lifestyle modifications are encouraged:  Continue regular exercise at least 30 minutes daily.  Lowfat/Low Cholesterol diet advised.      The following tests have been ordered:  Limited echo      Return to Clinic in 3 months or sooner if needed.

## 2025-01-23 NOTE — PROGRESS NOTES
"Routine Prenatal Visit  [facility] [place]  [address], [place] [address]    Assessment/Plan:  Greer is a 30 y.o. year old  at 33w6d who presents for routine prenatal visit.     1. 33 weeks gestation of pregnancy  Assessment & Plan:  Still thinking about TDAP  Declines RSV vaccine  Reviewed [facility], S/S PTL  Call with concerns  Orders:  -     POCT urine dip  2. BMI 30  3. Abnormal glucose tolerance test (GTT) during pregnancy, antepartum  Assessment & Plan:  Normal blood sugars with home testing, no GDM      Next OB Visit 2 weeks.    Subjective:     CC: Prenatal care    Greer Lu is a 30 y.o.  female who presents for routine prenatal care at 33w6d. Feels well, no complaints. Reports changes in baby's movement, still reaching kick counts.   Pregnancy ROS: no leakage of fluid, pelvic pain, or vaginal bleeding.  normal fetal movement.    The following portions of the patient's history were reviewed and updated as appropriate: allergies, current medications, past family history, past medical history, obstetric history, gynecologic history, past social history, past surgical history and problem list.      Objective:  /62 (BP Location: Left arm, Patient Position: Sitting, Cuff Size: Standard)   Ht 5' 4\" (1.626 m)   Wt 84 kg (185 lb 3.2 oz)   LMP 2024 (Exact Date)   BMI 31.79 kg/m²   Pregravid Weight/BMI: 78 kg (172 lb) (BMI 29.51)  Current Weight: 84 kg (185 lb 3.2 oz)   Total Weight Gain: 5.987 kg (13 lb 3.2 oz)   Pre-Renuka Vitals      Flowsheet Row Most Recent Value   Prenatal Assessment    Fetal Heart Rate 142   Fundal Height (cm) 35 cm   Movement Present   Presentation Vertex   Prenatal Vitals    Blood Pressure 124/62   Weight - Scale 84 kg (185 lb 3.2 oz)   Urine Albumin/Glucose    Dilation/Effacement/Station    Vaginal Drainage    Draining Fluid No   Edema    LLE Edema None   RLE Edema None   Facial Edema None             General: Well appearing, no " distress  Abdomen: Soft, gravid, nontender  Extremities: Non tender.

## 2025-01-28 ENCOUNTER — ROUTINE PRENATAL (OUTPATIENT)
Dept: OBGYN CLINIC | Facility: CLINIC | Age: 30
End: 2025-01-28
Payer: COMMERCIAL

## 2025-01-28 ENCOUNTER — NURSE TRIAGE (OUTPATIENT)
Age: 30
End: 2025-01-28

## 2025-01-28 VITALS
WEIGHT: 190 LBS | HEIGHT: 64 IN | BODY MASS INDEX: 32.44 KG/M2 | DIASTOLIC BLOOD PRESSURE: 76 MMHG | SYSTOLIC BLOOD PRESSURE: 120 MMHG

## 2025-01-28 DIAGNOSIS — O99.211 OTHER OBESITY DUE TO EXCESS CALORIES AFFECTING PREGNANCY IN FIRST TRIMESTER: Primary | ICD-10-CM

## 2025-01-28 DIAGNOSIS — Z3A.34 34 WEEKS GESTATION OF PREGNANCY: ICD-10-CM

## 2025-01-28 DIAGNOSIS — N89.8 VAGINAL DISCHARGE DURING PREGNANCY IN THIRD TRIMESTER: ICD-10-CM

## 2025-01-28 DIAGNOSIS — Z34.83 PRENATAL CARE, SUBSEQUENT PREGNANCY, THIRD TRIMESTER: ICD-10-CM

## 2025-01-28 DIAGNOSIS — E66.09 OTHER OBESITY DUE TO EXCESS CALORIES AFFECTING PREGNANCY IN FIRST TRIMESTER: Primary | ICD-10-CM

## 2025-01-28 DIAGNOSIS — O26.893 VAGINAL DISCHARGE DURING PREGNANCY IN THIRD TRIMESTER: ICD-10-CM

## 2025-01-28 LAB
BV WHIFF TEST VAG QL: NEGATIVE
CLUE CELLS SPEC QL WET PREP: NEGATIVE
PH SMN: 3.5 [PH]
T VAGINALIS VAG QL WET PREP: NEGATIVE
YEAST VAG QL WET PREP: NEGATIVE

## 2025-01-28 PROCEDURE — 87210 SMEAR WET MOUNT SALINE/INK: CPT | Performed by: NURSE PRACTITIONER

## 2025-01-28 PROCEDURE — PNV: Performed by: NURSE PRACTITIONER

## 2025-01-28 NOTE — TELEPHONE ENCOUNTER
"Pt calling in stating that she's 34w4d , pt saying she's experiencing discharge that was yellow tinted discharge, slightly green in color yesterday and now it is tan in color. Pt stating theres a slight odor. Pt had a temperature yesterday of 100.0F and pt currently has a cold, pt denies itching, vaginal bleeding, pain with urination    Pt is advised to be seen in the office today or tomorrow as per protocol, pt is scheduled for today at 4pm with Amina NEWTON.     Reason for Disposition   Abnormal color vaginal discharge (i.e., yellow, green, gray)    Answer Assessment - Initial Assessment Questions  1. DISCHARGE: \"Describe the discharge.\" (e.g., white, yellow, green, gray, foamy, cottage cheese-like)      slightly green in color yesterday and now it is tan in color  2. ODOR: \"Is there a bad odor?\"      Yes   3. ONSET: \"When did the discharge begin?\"      Yesterday   4. RASH: \"Is there a rash in that area?\" If Yes, ask: \"Describe it.\" (e.g., redness, blisters, sores, bumps)      Pt denies   5. ABDOMEN PAIN: \"Are you having any abdomen pain?\" If Yes, ask: \"What does it feel like?\" (e.g., crampy, dull, intermittent, constant)       Pt denies   6. ABDOMEN PAIN SEVERITY: If present, ask: \"How bad is it?\"  (e.g., Scale 1-10; mild, moderate, or severe)      Pt denies   7. CAUSE: \"What do you think is causing the discharge?\"      Pt unsure   8. OTHER SYMPTOMS: \"Do you have any other symptoms?\" (e.g., fever, itching, vaginal bleeding, pain with urination)      Pt had a temperature yesterday of 100.0F and pt currently has a cold, pt denies itching, vaginal bleeding, pain with urination  9. SURINDER: \"What date are you expecting to deliver?\"       3/7/25  10. PREGNANCY: \"How many weeks pregnant are you?\"        34w4d    Protocols used: Pregnancy - Vaginal Discharge-Adult-OH    "

## 2025-01-28 NOTE — PROGRESS NOTES
"Routine Prenatal Visit  [facility] [place]  [address], [place] [address]    Assessment/Plan:  Greer is a 30 y.o. year old  at 34w4d who presents for routine prenatal visit.     1. BMI 30  2. 34 weeks gestation of pregnancy  Assessment & Plan:  Reviewed [facility], S/S PTL  3. Prenatal care, subsequent pregnancy, third trimester  4. Vaginal discharge during pregnancy in third trimester  Assessment & Plan:  No evidence infection on wet mount, swab to lab to validate  Orders:  -     POCT wet mount  -     Sureswab(R), Bacterial Vaginosis/Vaginitis      Next OB Visit 1 weeks.    Subjective:     CC: Prenatal care    Greer Lu is a 30 y.o.  female who presents for routine prenatal care at 34w4d. Here for problem visit, c/o vaginal discharge that has not increased at all, but appeared slightly green yesterday and slightly tan today. Denies itching or odor. Had a low grad temperature 2 days ago, better now. No additional complaints.  Pregnancy ROS: no leakage of fluid, pelvic pain, or vaginal bleeding.  normal fetal movement.    The following portions of the patient's history were reviewed and updated as appropriate: allergies, current medications, past family history, past medical history, obstetric history, gynecologic history, past social history, past surgical history and problem list.      Objective:  /76 (BP Location: Right arm, Patient Position: Sitting, Cuff Size: Standard)   Ht 5' 4\" (1.626 m)   Wt 86.2 kg (190 lb)   LMP 2024 (Exact Date)   BMI 32.61 kg/m²   Pregravid Weight/BMI: 78 kg (172 lb) (BMI 29.51)  Current Weight: 86.2 kg (190 lb)   Total Weight Gain: 8.165 kg (18 lb)   Pre- Vitals      Flowsheet Row Most Recent Value   Prenatal Assessment    Fetal Heart Rate 144   Movement Present   Presentation Vertex   Prenatal Vitals    Blood Pressure 120/76   Weight - Scale 86.2 kg (190 lb)   Urine Albumin/Glucose    Dilation/Effacement/Station    Cervical Dilation " 0  [Speculum exam done, cervix visually closed, thick. Small amount white physiologic appearing discharge in vagina.]   Vaginal Drainage    Draining Fluid No   Edema    LLE Edema None   RLE Edema None   Facial Edema None             General: Well appearing, no distress  Abdomen: Soft, gravid, nontender  Extremities: Non tender.  Wet mount negative.

## 2025-01-29 LAB
BV BACTERIA RRNA VAG QL NAA+PROBE: NEGATIVE
C GLABRATA RNA VAG QL NAA+PROBE: NOT DETECTED
CANDIDA RRNA VAG QL PROBE: NOT DETECTED
T VAGINALIS RRNA SPEC QL NAA+PROBE: NOT DETECTED

## 2025-01-30 ENCOUNTER — RESULTS FOLLOW-UP (OUTPATIENT)
Dept: OBGYN CLINIC | Facility: CLINIC | Age: 30
End: 2025-01-30

## 2025-02-03 PROBLEM — Z3A.35 35 WEEKS GESTATION OF PREGNANCY: Status: ACTIVE | Noted: 2024-08-22

## 2025-02-05 ENCOUNTER — ROUTINE PRENATAL (OUTPATIENT)
Dept: OBGYN CLINIC | Facility: CLINIC | Age: 30
End: 2025-02-05
Payer: COMMERCIAL

## 2025-02-05 VITALS
DIASTOLIC BLOOD PRESSURE: 66 MMHG | BODY MASS INDEX: 32.61 KG/M2 | SYSTOLIC BLOOD PRESSURE: 118 MMHG | WEIGHT: 191 LBS | HEIGHT: 64 IN

## 2025-02-05 DIAGNOSIS — Z3A.35 35 WEEKS GESTATION OF PREGNANCY: ICD-10-CM

## 2025-02-05 DIAGNOSIS — Z34.83 PRENATAL CARE, SUBSEQUENT PREGNANCY, THIRD TRIMESTER: Primary | ICD-10-CM

## 2025-02-05 LAB
SL AMB  POCT GLUCOSE, UA: ABNORMAL
SL AMB POCT URINE PROTEIN: ABNORMAL

## 2025-02-05 PROCEDURE — 81002 URINALYSIS NONAUTO W/O SCOPE: CPT | Performed by: OBSTETRICS & GYNECOLOGY

## 2025-02-05 PROCEDURE — PNV: Performed by: OBSTETRICS & GYNECOLOGY

## 2025-02-05 NOTE — PROGRESS NOTES
"Routine Prenatal Visit  [facility] - [address], [address], [place] [address]    Assessment/Plan:  Greer is a 30 y.o. year old  at 35w5d who presents for routine prenatal visit.     Assessment & Plan  Prenatal care, subsequent pregnancy, third trimester  GBS next visit.       35 weeks gestation of pregnancy  Declined Adacel vaccine this pregnancy    Orders:    POCT urine dip      Next OB Visit 1 weeks.    Subjective:     CC: Prenatal care    Greer Lu is a 30 y.o.  female who presents for routine prenatal care at 35w5d.  Pregnancy ROS: no leakage of fluid, pelvic pain, or vaginal bleeding.  normal fetal movement.    The following portions of the patient's history were reviewed and updated as appropriate: allergies, current medications, past family history, past medical history, obstetric history, gynecologic history, past social history, past surgical history and problem list.      Objective:  /66 (BP Location: Left arm, Patient Position: Sitting, Cuff Size: Standard)   Ht 5' 4\" (1.626 m)   Wt 86.6 kg (191 lb)   LMP 2024 (Exact Date)   BMI 32.79 kg/m²   Pregravid Weight/BMI: 78 kg (172 lb) (BMI 29.51)  Current Weight: 86.6 kg (191 lb)   Total Weight Gain: 8.618 kg (19 lb)   Pre- Vitals      Flowsheet Row Most Recent Value   Prenatal Assessment    Fetal Heart Rate 155   Fundal Height (cm) 35 cm   Movement Present   Presentation Vertex   Prenatal Vitals    Blood Pressure 118/66   Weight - Scale 86.6 kg (191 lb)   Urine Albumin/Glucose    Dilation/Effacement/Station    Vaginal Drainage    Edema    LLE Edema None   RLE Edema None             General: Well appearing, no distress  Abdomen: Soft, gravid, nontender  Extremities: Non tender.  "

## 2025-02-13 ENCOUNTER — ROUTINE PRENATAL (OUTPATIENT)
Dept: OBGYN CLINIC | Facility: CLINIC | Age: 30
End: 2025-02-13
Payer: COMMERCIAL

## 2025-02-13 VITALS
WEIGHT: 193 LBS | SYSTOLIC BLOOD PRESSURE: 122 MMHG | BODY MASS INDEX: 32.95 KG/M2 | HEIGHT: 64 IN | DIASTOLIC BLOOD PRESSURE: 84 MMHG

## 2025-02-13 DIAGNOSIS — Z36.85 ENCOUNTER FOR ANTENATAL SCREENING FOR STREPTOCOCCUS B: ICD-10-CM

## 2025-02-13 DIAGNOSIS — Z3A.36 36 WEEKS GESTATION OF PREGNANCY: ICD-10-CM

## 2025-02-13 DIAGNOSIS — Z34.83 PRENATAL CARE, SUBSEQUENT PREGNANCY, THIRD TRIMESTER: Primary | ICD-10-CM

## 2025-02-13 LAB
SL AMB  POCT GLUCOSE, UA: NORMAL
SL AMB POCT URINE PROTEIN: NORMAL

## 2025-02-13 PROCEDURE — PNV: Performed by: OBSTETRICS & GYNECOLOGY

## 2025-02-13 PROCEDURE — 81002 URINALYSIS NONAUTO W/O SCOPE: CPT | Performed by: OBSTETRICS & GYNECOLOGY

## 2025-02-13 NOTE — ASSESSMENT & PLAN NOTE
Reviewed birth plan, all reasonable. Discussed I recommend initiating pitocin after placental delivery to prevent PPH. Pt agreeable. Otherwise scanned into chart.

## 2025-02-13 NOTE — PROGRESS NOTES
"Routine Prenatal Visit  [facility] - [place]  [address], [place] [address]    Assessment/Plan:  Greer is a 30 y.o. year old  at 36w6d who presents for routine prenatal visit.     1. Prenatal care, subsequent pregnancy, third trimester  Assessment & Plan:  - Labor/Bleeding/ROM precautions given. Kick counts reviewed  - GBS swab collected today.  - Delivery consent reviewed and signed today. Risks of delivery reviewed, including bleeding, infection, damage to surrounding organs/structures (specifically bowel, bladder, vessels, nerves, ureters), need for operative vaginal delivery or  delivery, hysterectomy, need for blood transfusion, need for further surgery.   - Problem list updated, results console reviewed and updated with pertinent prenatal labs.  - PMH, PSH, medications reviewed and updated as needed  - Return to office in 1 wk(s) for routine prenatal care    2. 36 weeks gestation of pregnancy  Assessment & Plan:  Reviewed birth plan, all reasonable. Discussed I recommend initiating pitocin after placental delivery to prevent PPH. Pt agreeable. Otherwise scanned into chart.   Orders:  -     POCT urine dip  3. Encounter for  screening for Streptococcus B  -     Streptococcus Group B DNA PCR,Broth Enrich W/Rfl        Subjective:   Greer Lu is a 30 y.o.  who presents for routine prenatal care at 36w6d.  Complaints today: Denies  LOF: -; VB: -; Contractions: -; FM: +    Objective:  /84 (BP Location: Left arm, Patient Position: Sitting, Cuff Size: Standard)   Ht 5' 4\" (1.626 m)   Wt 87.5 kg (193 lb)   LMP 2024 (Exact Date)   BMI 33.13 kg/m²     General: Well appearing, no distress  Respiratory: Unlabored breathing  Abdomen: Soft, gravid, nontender  Extremities: Warm and well perfused.  Non tender.    Pregravid Weight/BMI: 78 kg (172 lb) (BMI 29.51)  Current Weight: 87.5 kg (193 lb)   Total Weight Gain: 9.526 kg (21 lb)     Pre- Vitals  "     Flowsheet Row Most Recent Value   Prenatal Assessment    Fetal Heart Rate 145   Fundal Height (cm) 36 cm   Movement Present   Presentation Vertex   Prenatal Vitals    Blood Pressure 122/84   Weight - Scale 87.5 kg (193 lb)   Urine Albumin/Glucose    Dilation/Effacement/Station    Vaginal Drainage    Edema              Fernando Muñiz,   2/13/2025 9:29 AM

## 2025-02-15 LAB
GP B STREP DNA SPEC QL NAA+PROBE: NOT DETECTED
SPECIMEN SOURCE: NORMAL

## 2025-02-18 PROBLEM — Z3A.37 37 WEEKS GESTATION OF PREGNANCY: Status: ACTIVE | Noted: 2024-08-22

## 2025-02-18 PROBLEM — O36.8130 DECREASED FETAL MOVEMENTS IN THIRD TRIMESTER: Status: RESOLVED | Noted: 2025-01-03 | Resolved: 2025-02-18

## 2025-02-18 PROBLEM — O26.893 VAGINAL DISCHARGE DURING PREGNANCY IN THIRD TRIMESTER: Status: RESOLVED | Noted: 2025-01-28 | Resolved: 2025-02-18

## 2025-02-18 PROBLEM — N89.8 VAGINAL DISCHARGE DURING PREGNANCY IN THIRD TRIMESTER: Status: RESOLVED | Noted: 2025-01-28 | Resolved: 2025-02-18

## 2025-02-18 NOTE — ASSESSMENT & PLAN NOTE
- Labs up to date   - Genetic testing deferred   - Unremarkable level II US   - Vaccinations: declined previously   - GBS negative   - Desires SVE today, 1.5-2/50/-3; fetus vertex on US (patient desired a check)   - RTC for 38 week visit     Orders:    POCT urine dip

## 2025-02-19 ENCOUNTER — ROUTINE PRENATAL (OUTPATIENT)
Dept: OBGYN CLINIC | Facility: CLINIC | Age: 30
End: 2025-02-19
Payer: COMMERCIAL

## 2025-02-19 ENCOUNTER — TELEPHONE (OUTPATIENT)
Age: 30
End: 2025-02-19

## 2025-02-19 VITALS — SYSTOLIC BLOOD PRESSURE: 96 MMHG | BODY MASS INDEX: 33.23 KG/M2 | DIASTOLIC BLOOD PRESSURE: 60 MMHG | WEIGHT: 193.6 LBS

## 2025-02-19 DIAGNOSIS — Z3A.37 37 WEEKS GESTATION OF PREGNANCY: ICD-10-CM

## 2025-02-19 DIAGNOSIS — Z34.83 PRENATAL CARE, SUBSEQUENT PREGNANCY, THIRD TRIMESTER: ICD-10-CM

## 2025-02-19 DIAGNOSIS — O99.810 ABNORMAL GLUCOSE TOLERANCE TEST (GTT) DURING PREGNANCY, ANTEPARTUM: ICD-10-CM

## 2025-02-19 DIAGNOSIS — O99.211 OTHER OBESITY DUE TO EXCESS CALORIES AFFECTING PREGNANCY IN FIRST TRIMESTER: Primary | ICD-10-CM

## 2025-02-19 DIAGNOSIS — E66.09 OTHER OBESITY DUE TO EXCESS CALORIES AFFECTING PREGNANCY IN FIRST TRIMESTER: Primary | ICD-10-CM

## 2025-02-19 LAB
SL AMB  POCT GLUCOSE, UA: NEGATIVE
SL AMB POCT URINE PROTEIN: NEGATIVE

## 2025-02-19 PROCEDURE — 81002 URINALYSIS NONAUTO W/O SCOPE: CPT | Performed by: STUDENT IN AN ORGANIZED HEALTH CARE EDUCATION/TRAINING PROGRAM

## 2025-02-19 PROCEDURE — PNV: Performed by: STUDENT IN AN ORGANIZED HEALTH CARE EDUCATION/TRAINING PROGRAM

## 2025-02-19 NOTE — TELEPHONE ENCOUNTER
"Pt calling in stating she's 37w5d , pt is reporting feeling hot and shaky while at work. Pt saying that she last ate at 830am, and is now sitting down to eat now. Pt denies feeling like she is going to pass out.  Pt reporting that she hasn't done a kick count recently, pt stating she's unsure about fetal movement since she's been working \"on my feet\". Pt had a prenatal appt this morning with Dr Castillo. Pt denies leakage of fluid, vaginal bleeding.     Epic Secure Chat sent to Dr Garcia- on call  Epic Secure Chat received: eat, hydrated and do a kick count. If feeling unwell or baby doesn't meet kick count, needs to be evaluated.     Pt was notified of the providers recommendations, pt verbalized understanding, no further questions.   "

## 2025-02-27 ENCOUNTER — ROUTINE PRENATAL (OUTPATIENT)
Dept: OBGYN CLINIC | Facility: CLINIC | Age: 30
End: 2025-02-27
Payer: COMMERCIAL

## 2025-02-27 VITALS
BODY MASS INDEX: 33.12 KG/M2 | WEIGHT: 194 LBS | HEIGHT: 64 IN | DIASTOLIC BLOOD PRESSURE: 64 MMHG | SYSTOLIC BLOOD PRESSURE: 114 MMHG

## 2025-02-27 DIAGNOSIS — Z34.83 PRENATAL CARE, SUBSEQUENT PREGNANCY, THIRD TRIMESTER: Primary | ICD-10-CM

## 2025-02-27 DIAGNOSIS — Z3A.39 39 WEEKS GESTATION OF PREGNANCY: ICD-10-CM

## 2025-02-27 LAB
SL AMB  POCT GLUCOSE, UA: NORMAL
SL AMB POCT URINE PROTEIN: NORMAL

## 2025-02-27 PROCEDURE — 81002 URINALYSIS NONAUTO W/O SCOPE: CPT | Performed by: OBSTETRICS & GYNECOLOGY

## 2025-02-27 PROCEDURE — PNV: Performed by: OBSTETRICS & GYNECOLOGY

## 2025-02-27 NOTE — PROGRESS NOTES
"Routine Prenatal Visit  [facility] - [place]  [address] [place] [address]    Assessment/Plan:  Greer is a 30 y.o. year old  at 38w6d who presents for routine prenatal visit.     1. Prenatal care, subsequent pregnancy, third trimester  2. 39 weeks gestation of pregnancy  Assessment & Plan:  Reviewed option of elective labor induction, guerrero in light of a short labor in her first pregnancy. She prefers expectant management at this time. S/S labor reviewed.   Orders:  -     POCT urine dip        Subjective:     CC: Prenatal care    Greer Lu is a 30 y.o.  female who presents for routine prenatal care at 38w6d.  Pregnancy ROS: no leakage of fluid, pelvic pain, or vaginal bleeding.  normal fetal movement.    The following portions of the patient's history were reviewed and updated as appropriate: allergies, current medications, past family history, past medical history, obstetric history, gynecologic history, past social history, past surgical history and problem list.      Objective:  /64 (BP Location: Left arm, Patient Position: Sitting, Cuff Size: Standard)   Ht 5' 4\" (1.626 m)   Wt 88 kg (194 lb)   LMP 2024 (Exact Date)   BMI 33.30 kg/m²   Pregravid Weight/BMI: 78 kg (172 lb) (BMI 29.51)  Current Weight: 88 kg (194 lb)   Total Weight Gain: 9.979 kg (22 lb)   Pre-Renuka Vitals      Flowsheet Row Most Recent Value   Prenatal Assessment    Fetal Heart Rate 145   Fundal Height (cm) 38 cm   Movement Present   Presentation Vertex   Prenatal Vitals    Blood Pressure 114/64   Weight - Scale 88 kg (194 lb)   Urine Albumin/Glucose    Dilation/Effacement/Station    Cervical Dilation 3   Cervical Effacement 60   Fetal Station -1   Vaginal Drainage    Edema              General: Well appearing, no distress  Respiratory: Unlabored breathing  Cardiovascular: Regular rate.  Abdomen: Soft, gravid, nontender  Fundal Height: Appropriate for gestational age.  Extremities: Warm and well " perfused.  Non tender.

## 2025-02-27 NOTE — ASSESSMENT & PLAN NOTE
Reviewed option of elective labor induction, guerrero in light of a short labor in her first pregnancy. She prefers expectant management at this time. S/S labor reviewed.

## 2025-02-28 ENCOUNTER — HOSPITAL ENCOUNTER (INPATIENT)
Facility: HOSPITAL | Age: 30
LOS: 1 days | Discharge: HOME | End: 2025-03-01
Attending: OBSTETRICS & GYNECOLOGY | Admitting: OBSTETRICS & GYNECOLOGY
Payer: COMMERCIAL

## 2025-02-28 ENCOUNTER — NURSE TRIAGE (OUTPATIENT)
Dept: OTHER | Facility: OTHER | Age: 30
End: 2025-02-28

## 2025-02-28 DIAGNOSIS — Z34.83 PRENATAL CARE, SUBSEQUENT PREGNANCY, THIRD TRIMESTER: ICD-10-CM

## 2025-02-28 LAB
ABO GROUP BLD: NORMAL
BASE EXCESS BLDCOA CALC-SCNC: -3.9 MMOL/L (ref 3–11)
BASE EXCESS BLDCOV CALC-SCNC: -3.9 MMOL/L (ref 1–9)
BASOPHILS # BLD AUTO: 0.01 THOUSANDS/ÂΜL (ref 0–0.1)
BASOPHILS NFR BLD AUTO: 0 % (ref 0–1)
BLD GP AB SCN SERPL QL: NEGATIVE
EOSINOPHIL # BLD AUTO: 0.06 THOUSAND/ÂΜL (ref 0–0.61)
EOSINOPHIL NFR BLD AUTO: 1 % (ref 0–6)
ERYTHROCYTE [DISTWIDTH] IN BLOOD BY AUTOMATED COUNT: 12.9 % (ref 11.6–15.1)
HCO3 BLDCOA-SCNC: 23.3 MMOL/L (ref 17.3–27.3)
HCO3 BLDCOV-SCNC: 17.8 MMOL/L (ref 12.2–28.6)
HCT VFR BLD AUTO: 36.8 % (ref 34.8–46.1)
HGB BLD-MCNC: 12.5 G/DL (ref 11.5–15.4)
HOLD SPECIMEN: NORMAL
IMM GRANULOCYTES # BLD AUTO: 0.06 THOUSAND/UL (ref 0–0.2)
IMM GRANULOCYTES NFR BLD AUTO: 1 % (ref 0–2)
LYMPHOCYTES # BLD AUTO: 1.64 THOUSANDS/ÂΜL (ref 0.6–4.47)
LYMPHOCYTES NFR BLD AUTO: 22 % (ref 14–44)
MCH RBC QN AUTO: 30.3 PG (ref 26.8–34.3)
MCHC RBC AUTO-ENTMCNC: 34 G/DL (ref 31.4–37.4)
MCV RBC AUTO: 89 FL (ref 82–98)
MONOCYTES # BLD AUTO: 0.49 THOUSAND/ÂΜL (ref 0.17–1.22)
MONOCYTES NFR BLD AUTO: 6 % (ref 4–12)
NEUTROPHILS # BLD AUTO: 5.37 THOUSANDS/ÂΜL (ref 1.85–7.62)
NEUTS SEG NFR BLD AUTO: 70 % (ref 43–75)
NRBC BLD AUTO-RTO: 0 /100 WBCS
O2 CT VFR BLDCOA CALC: 10.9 ML/DL
OXYHGB MFR BLDCOA: 52.4 %
OXYHGB MFR BLDCOV: 83.6 %
PCO2 BLDCOA: 50.6 MM[HG] (ref 30–60)
PCO2 BLDCOV: 25.2 MM HG (ref 27–43)
PH BLDCOA: 7.28 [PH] (ref 7.23–7.43)
PH BLDCOV: 7.47 [PH] (ref 7.19–7.49)
PLATELET # BLD AUTO: 144 THOUSANDS/UL (ref 149–390)
PMV BLD AUTO: 11.8 FL (ref 8.9–12.7)
PO2 BLDCOA: 24.4 MM HG (ref 5–25)
PO2 BLDCOV: 36.3 MM HG (ref 15–45)
RBC # BLD AUTO: 4.13 MILLION/UL (ref 3.81–5.12)
RH BLD: POSITIVE
SAO2 % BLDCOV: 17.4 ML/DL
SPECIMEN EXPIRATION DATE: NORMAL
WBC # BLD AUTO: 7.63 THOUSAND/UL (ref 4.31–10.16)

## 2025-02-28 PROCEDURE — 4A1HXCZ MONITORING OF PRODUCTS OF CONCEPTION, CARDIAC RATE, EXTERNAL APPROACH: ICD-10-PCS | Performed by: OBSTETRICS & GYNECOLOGY

## 2025-02-28 PROCEDURE — NC001 PR NO CHARGE: Performed by: OBSTETRICS & GYNECOLOGY

## 2025-02-28 PROCEDURE — 86900 BLOOD TYPING SEROLOGIC ABO: CPT | Performed by: OBSTETRICS & GYNECOLOGY

## 2025-02-28 PROCEDURE — 85025 COMPLETE CBC W/AUTO DIFF WBC: CPT | Performed by: OBSTETRICS & GYNECOLOGY

## 2025-02-28 PROCEDURE — 59400 OBSTETRICAL CARE: CPT | Performed by: OBSTETRICS & GYNECOLOGY

## 2025-02-28 PROCEDURE — 86850 RBC ANTIBODY SCREEN: CPT | Performed by: OBSTETRICS & GYNECOLOGY

## 2025-02-28 PROCEDURE — 86901 BLOOD TYPING SEROLOGIC RH(D): CPT | Performed by: OBSTETRICS & GYNECOLOGY

## 2025-02-28 PROCEDURE — 82805 BLOOD GASES W/O2 SATURATION: CPT | Performed by: OBSTETRICS & GYNECOLOGY

## 2025-02-28 PROCEDURE — 99212 OFFICE O/P EST SF 10 MIN: CPT

## 2025-02-28 RX ORDER — SODIUM CHLORIDE, SODIUM LACTATE, POTASSIUM CHLORIDE, CALCIUM CHLORIDE 600; 310; 30; 20 MG/100ML; MG/100ML; MG/100ML; MG/100ML
125 INJECTION, SOLUTION INTRAVENOUS CONTINUOUS
Status: DISCONTINUED | OUTPATIENT
Start: 2025-02-28 | End: 2025-02-28

## 2025-02-28 RX ORDER — SIMETHICONE 80 MG
80 TABLET,CHEWABLE ORAL 4 TIMES DAILY PRN
Status: DISCONTINUED | OUTPATIENT
Start: 2025-02-28 | End: 2025-03-01 | Stop reason: HOSPADM

## 2025-02-28 RX ORDER — OXYTOCIN/RINGER'S LACTATE 30/500 ML
250 PLASTIC BAG, INJECTION (ML) INTRAVENOUS ONCE
Status: DISCONTINUED | OUTPATIENT
Start: 2025-02-28 | End: 2025-03-01

## 2025-02-28 RX ORDER — BUTORPHANOL TARTRATE 1 MG/ML
1 INJECTION, SOLUTION INTRAMUSCULAR; INTRAVENOUS
Status: DISCONTINUED | OUTPATIENT
Start: 2025-02-28 | End: 2025-02-28

## 2025-02-28 RX ORDER — ONDANSETRON 2 MG/ML
4 INJECTION INTRAMUSCULAR; INTRAVENOUS EVERY 8 HOURS PRN
Status: DISCONTINUED | OUTPATIENT
Start: 2025-02-28 | End: 2025-03-01 | Stop reason: HOSPADM

## 2025-02-28 RX ORDER — DIPHENHYDRAMINE HCL 25 MG
25 TABLET ORAL EVERY 6 HOURS PRN
Status: DISCONTINUED | OUTPATIENT
Start: 2025-02-28 | End: 2025-03-01 | Stop reason: HOSPADM

## 2025-02-28 RX ORDER — OXYCODONE HYDROCHLORIDE 5 MG/1
5 TABLET ORAL
Refills: 0 | Status: DISCONTINUED | OUTPATIENT
Start: 2025-02-28 | End: 2025-03-01

## 2025-02-28 RX ORDER — BENZOCAINE/MENTHOL 6 MG-10 MG
1 LOZENGE MUCOUS MEMBRANE DAILY PRN
Status: DISCONTINUED | OUTPATIENT
Start: 2025-02-28 | End: 2025-03-01 | Stop reason: HOSPADM

## 2025-02-28 RX ORDER — ACETAMINOPHEN 325 MG/1
650 TABLET ORAL EVERY 4 HOURS PRN
Status: DISCONTINUED | OUTPATIENT
Start: 2025-02-28 | End: 2025-03-01 | Stop reason: HOSPADM

## 2025-02-28 RX ORDER — CALCIUM CARBONATE 500 MG/1
1000 TABLET, CHEWABLE ORAL DAILY PRN
Status: DISCONTINUED | OUTPATIENT
Start: 2025-02-28 | End: 2025-03-01 | Stop reason: HOSPADM

## 2025-02-28 RX ORDER — IBUPROFEN 600 MG/1
600 TABLET, FILM COATED ORAL EVERY 6 HOURS
Status: DISCONTINUED | OUTPATIENT
Start: 2025-02-28 | End: 2025-03-01 | Stop reason: HOSPADM

## 2025-02-28 RX ORDER — DOCUSATE SODIUM 100 MG/1
100 CAPSULE, LIQUID FILLED ORAL 2 TIMES DAILY
Status: DISCONTINUED | OUTPATIENT
Start: 2025-02-28 | End: 2025-03-01 | Stop reason: HOSPADM

## 2025-02-28 RX ORDER — BUPIVACAINE HYDROCHLORIDE 2.5 MG/ML
30 INJECTION, SOLUTION EPIDURAL; INFILTRATION; INTRACAUDAL ONCE AS NEEDED
Status: DISCONTINUED | OUTPATIENT
Start: 2025-02-28 | End: 2025-02-28

## 2025-02-28 RX ADMIN — BENZOCAINE AND LEVOMENTHOL 1 APPLICATION: 200; 5 SPRAY TOPICAL at 06:40

## 2025-02-28 RX ADMIN — ACETAMINOPHEN 650 MG: 325 TABLET, FILM COATED ORAL at 18:08

## 2025-02-28 RX ADMIN — IBUPROFEN 600 MG: 600 TABLET, FILM COATED ORAL at 05:05

## 2025-02-28 RX ADMIN — ACETAMINOPHEN 650 MG: 325 TABLET, FILM COATED ORAL at 10:42

## 2025-02-28 RX ADMIN — IBUPROFEN 600 MG: 600 TABLET, FILM COATED ORAL at 13:56

## 2025-02-28 RX ADMIN — ACETAMINOPHEN 650 MG: 325 TABLET, FILM COATED ORAL at 05:50

## 2025-02-28 NOTE — LACTATION NOTE
This note was copied from a baby's chart.  CONSULT - LACTATION  Baby Girl Lu (Ashley) 0 days female MRN: [id number]    [facility] NURSERY Room / Bed: (N)/(N) Encounter: [id number]    Maternal Information     MOTHER:  Greer Lu  Maternal Age: 30 y.o.  OB History: # 1 - Date: 22, Sex: Female, Weight: 3005 g (6 lb 10 oz), GA: 37w0d, Type: Vaginal, Spontaneous, Apgar1: None, Apgar5: None, Living: Living, Birth Comments: None    # 2 - Date: 25, Sex: Female, Weight: 3020 g (6 lb 10.5 oz), GA: 39w0d, Type: Vaginal, Spontaneous, Apgar1: 8, Apgar5: 9, Living: Living, Birth Comments: None   Previous breast reduction surgery? No    Lactation history:   Has patient previously breast fed: Yes   How long had patient previously breast fed: 4 months @ breast then up to 10 months of breastmilk   Previous breast feeding complications: Other (Comment) (felt pressured to learn; tongue tie)     Past Surgical History:   Procedure Laterality Date    TONSILLECTOMY  2017    TOOTH EXTRACTION         Birth information:  YOB: 2025   Time of birth: 4:43 AM   Sex: female   Delivery type: Vaginal, Spontaneous   Birth Weight: 3020 g (6 lb 10.5 oz)   Percent of Weight Change: 0%     Gestational Age: 39w0d   [unfilled]    Reason for Consult:    Reason for Consult: Initial assessment (routine) - 10 min, Latch Assess (ext) - 20 min, Discharge (routine) - 10 min    Risk Factors:         Breast and nipple assessment:   Breasts/Nipples  Left Breast: Soft  Right Breast: Soft  Left Nipple: Everted  Right Nipple: Everted  Intervention: Other (comment) (Mostly verbal guidance for deep latching as per Ayers Method & support available)  Breastfeeding Status: Yes  Breastfeeding Progress: Not yet established    OB Lactation Tools:         Breast Pump:    Breast Pump  Pump: Personal (states has breast pump @ Home)  Pump Review/Education: Milk storage        Assessment: normal assessment    Feeding assessment: feeding well    LATCH:  Latch: Grasps breast, tongue down, lips flanged, rhythmic sucking   Audible Swallowing: A few with stimulation   Type of Nipple: Everted (After stimulation)   Comfort (Breast/Nipple): Soft/non-tender   Hold (Positioning): No assist from staff, mother able to position/hold infant (Verbal instruction)   LATCH Score: 9       Olesya:                   Feeding recommendations:  breast feed on demand      Initial Visit with experienced Family. Mom states her first breast feeding experience was complicated by feeling pressured and stressed to learn breastfeeding. States she researched the Ayers Method and would like guidance learning that way. Admits to some breast soreness and nipple compression. Emotional support given for good perseverance and research to guide her own personal journey. Verbal guidance and minimal light touch guidance to bring baby belly to belly ; nose to nipple and roll arm to bring baby to breast then slightly roll away for baby to breathe. Suggested shaping breast to guide deep latch for baby to have large gape/ at breast. Mom practiced on left breast then right for good results. Nursing parent  was able to note signs of a good feeding like: less discomfort after latch on tenderness, good rocker suckling with stimulation, breast softening; rounded nipple and relaxed tone after nursing at breast. .  Mom was able to maintain feed and left to bond and build confidence. Mom seems pleased with session. Dad remains supportive at bedside.     Also Ready, Set Baby & discharge breastfeeding booklets at bedside including the feeding log. Emphasized 8 or more (12) feedings in a 24 hour period, what to expect for the number of diapers per day of life and the progression of properties of the  stooling pattern.    List of reasons to call a lactation consultant.  Feeding logs  Feeding cues  Hand expression  Baby's Second  day (cluster feeding)  Breastfeeding and Your Lifestyle (Medications, Alcohol, Caffeine, Smoking, Street Drugs, Methadone)  First Two Weeks Survival Guide for Breastfeeding  Breast Changes  Physical Therapy  Storage and Handling of Breast milk  How to Keep Your Breast Pump Kit Clean  The Employed Breastfeeding Mother  Mixed feeding  Bottle feeding like breastfeeding (paced bottle feeding)  astfeeding and your lifestyle, storage and preparation of breast milk, how to keep you breast pump clean, the employed breastfeeding mother and paced bottle feeding handouts.     Booklet included Breastfeeding Resources for after discharge including access to the number for the [facility].      Encouraged parents to call for assistance, questions, and concerns about breastfeeding.         Melissa Ball RN 2/28/2025 2:49 PM

## 2025-02-28 NOTE — PLAN OF CARE

## 2025-02-28 NOTE — PROGRESS NOTES
Progress Note - OB/GYN   Name: Greer Lu 30 y.o. female I MRN: [id number]  Unit/Bed#: -01 I Date of Admission: 2/28/2025   Date of Service: 2/28/2025 I Hospital Day: 0     Assessment & Plan  Prenatal care, subsequent pregnancy, third trimester  Desires natural labor. Declined AROM  39 weeks gestation of pregnancy    Day of delivery  Pain controlled  Breast feeding  Dispo:  anticipate d/c home PP day #1 or 2.       OB L&D Progress Note  Subjective   Doing well. No complaints.  Breast feeding.     Objective :  Temp:  [97.6 °F (36.4 °C)-98.5 °F (36.9 °C)] 98 °F (36.7 °C)  HR:  [] 80  BP: (116-134)/(56-85) 127/85  Resp:  [16-20] 18  SpO2:  [98 %-99 %] 98 %  O2 Device: None (Room air)    Physical Exam  HENT:      Head: Normocephalic and atraumatic.   Cardiovascular:      Rate and Rhythm: Normal rate and regular rhythm.   Pulmonary:      Effort: Pulmonary effort is normal.   Neurological:      Mental Status: She is alert and oriented to person, place, and time.   Psychiatric:         Mood and Affect: Mood normal.         Behavior: Behavior normal.         Thought Content: Thought content normal.         Judgment: Judgment normal.           Lab Results: I have reviewed the following results:CBC/BMP:   .     02/28/25  0126   WBC 7.63   HGB 12.5   HCT 36.8   *    , Creatinine Clearance: CrCl cannot be calculated (Patient's most recent lab result is older than the maximum 7 days allowed.)., LFTs: No new results in last 24 hours.         JOELLE Gardner MD, FACOG

## 2025-02-28 NOTE — L&D DELIVERY NOTE
"DELIVERY NOTE  Greer Lu 30 y.o. female MRN: [id number]  Unit/Bed#: -01 Encounter: [id number]    Obstetrician:  Fernando Muñiz DO    Pre-Delivery Diagnosis: Bales pregnancy in vertex presentation at 39w0d gestation.    Post-Delivery Diagnosis: Same, delivered    Procedure: Spontaneous vaginal delivery    Delivery Date and Time:  2/28/25 0443    Umbilical Artery  No results for input(s): \"PHCART\", \"BECART\" in the last 72 hours.    Umbilical Vein  No results for input(s): \"PHCVEN\", \"BECVEN\" in the last 72 hours.    Apgars: Pending per nursing    Weight: Pending           Complications: None    Description of Procedure:  Patient was found to be completely dilated and +2 station. She pushed for under 5 minutes to spontaneously deliver a liveborn infant in OA position through clear fluid at 0443. The head and shoulders delivered easily, with the body easily delivering thereafter. The infant was placed on maternal abdomen. Cord clamping was delayed for until no longer pulsing and white per patient request, after which the cord was doubly clamped and cut. Routine cord gases and cord blood were collected. Pitocin was started for active management of the 3rd stage. Placenta delivered spontaneously and was noted to have a centrally-inserted 3-vessel cord. The placenta was sent for storage. Bimanual exam was performed, and the fundus was noted to be firm. A thorough pelvic exam revealed  no laceration,. Excellent hemostasis was noted, with total QBL pending per nursing. All needle, sponge, and instrument counts were noted to be correct. The patient tolerated the procedure well and was allowed to recover in labor and delivery room.  Pitocin not initiated per patient request.     Fernando Muñiz DO  2/28/2025 4:53 AM      "

## 2025-02-28 NOTE — PLAN OF CARE
Problem: BIRTH - VAGINAL/ SECTION  Goal: Fetal and maternal status remain reassuring during the birth process  Description: INTERVENTIONS:  - Monitor vital signs  - Monitor fetal heart rate  - Monitor uterine activity  - Monitor labor progression (vaginal delivery)  - DVT prophylaxis  - Antibiotic prophylaxis  Outcome: Progressing  Goal: Emotionally satisfying birthing experience for mother/fetus  Description: Interventions:  - Assess, plan, implement and evaluate the nursing care given to the patient in labor  - Advocate the philosophy that each childbirth experience is a unique experience and support the family's chosen level of involvement and control during the labor process   - Actively participate in both the patient's and family's teaching of the birth process  - Consider cultural, Congregation and age-specific factors and plan care for the patient in labor  Outcome: Progressing     Problem: POSTPARTUM  Goal: Experiences normal postpartum course  Description: INTERVENTIONS:  - Monitor maternal vital signs  - Assess uterine involution and lochia  Outcome: Progressing  Goal: Appropriate maternal -  bonding  Description: INTERVENTIONS:  - Identify family support  - Assess for appropriate maternal/infant bonding   -Encourage maternal/infant bonding opportunities  - Referral to  or  as needed  Outcome: Progressing  Goal: Establishment of infant feeding pattern  Description: INTERVENTIONS:  - Assess breast/bottle feeding  - Refer to lactation as needed  Outcome: Progressing  Goal: Incision(s), wounds(s) or drain site(s) healing without S/S of infection  Description: INTERVENTIONS  - Assess and document dressing, incision, wound bed, drain sites and surrounding tissue  - Provide patient and family education    Outcome: Progressing     Problem: PAIN - ADULT  Goal: Verbalizes/displays adequate comfort level or baseline comfort level  Description: Interventions:  - Encourage  patient to monitor pain and request assistance  - Assess pain using appropriate pain scale  - Administer analgesics based on type and severity of pain and evaluate response  - Implement non-pharmacological measures as appropriate and evaluate response  - Consider cultural and social influences on pain and pain management  - Notify physician/advanced practitioner if interventions unsuccessful or patient reports new pain  Outcome: Progressing     Problem: INFECTION - ADULT  Goal: Absence or prevention of progression during hospitalization  Description: INTERVENTIONS:  - Assess and monitor for signs and symptoms of infection  - Monitor lab/diagnostic results  - Monitor all insertion sites, i.e. indwelling lines, tubes, and drains  - Monitor endotracheal if appropriate and nasal secretions for changes in amount and color  - [place] appropriate cooling/warming therapies per order  - Administer medications as ordered  - Instruct and encourage patient and family to use good hand hygiene technique  - Identify and instruct in appropriate isolation precautions for identified infection/condition  Outcome: Progressing  Goal: Absence of fever/infection during neutropenic period  Description: INTERVENTIONS:  - Monitor WBC    Outcome: Progressing     Problem: SAFETY ADULT  Goal: Patient will remain free of falls  Description: INTERVENTIONS:  - Educate patient/family on patient safety including physical limitations  - Instruct patient to call for assistance with activity   - Consult OT/PT to assist with strengthening/mobility   - Keep Call bell within reach  - Keep bed low and locked with side rails adjusted as appropriate  - Keep care items and personal belongings within reach  - Initiate and maintain comfort rounds  - Make Fall Risk Sign visible to staff    - Apply yellow socks and bracelet for high fall risk patients  - Consider moving patient to room near nurses station  Outcome: Progressing  Goal: Maintain or return to  baseline ADL function  Description: INTERVENTIONS:  -  Assess patient's ability to carry out ADLs; assess patient's baseline for ADL function and identify physical deficits which impact ability to perform ADLs (bathing, care of mouth/teeth, toileting, grooming, dressing, etc.)  - Assess/evaluate cause of self-care deficits   - Assess range of motion  - Assess patient's mobility; develop plan if impaired  - Assess patient's need for assistive devices and provide as appropriate  - Encourage maximum independence but intervene and supervise when necessary  - Involve family in performance of ADLs  - Assess for home care needs following discharge   - Consider OT consult to assist with ADL evaluation and planning for discharge  - Provide patient education as appropriate  Outcome: Progressing  Goal: Maintains/Returns to pre admission functional level  Description: INTERVENTIONS:  - Perform AM-PAC 6 Click Basic Mobility/ Daily Activity assessment daily.  - Set and communicate daily mobility goal to care team and patient/family/caregiver.   - Collaborate with rehabilitation services on mobility goals if consulted    - Out of bed for toileting  - Record patient progress and toleration of activity level   Outcome: Progressing     Problem: Knowledge Deficit  Goal: Patient/family/caregiver demonstrates understanding of disease process, treatment plan, medications, and discharge instructions  Description: Complete learning assessment and assess knowledge base.  Interventions:  - Provide teaching at level of understanding  - Provide teaching via preferred learning methods  Outcome: Progressing     Problem: DISCHARGE PLANNING  Goal: Discharge to home or other facility with appropriate resources  Description: INTERVENTIONS:  - Identify barriers to discharge w/patient and caregiver  - Arrange for needed discharge resources and transportation as appropriate  - Identify discharge learning needs (meds, wound care, etc.)  - Arrange for  interpretive services to assist at discharge as needed  - Refer to Case Management Department for coordinating discharge planning if the patient needs post-hospital services based on physician/advanced practitioner order or complex needs related to functional status, cognitive ability, or social support system  Outcome: Progressing

## 2025-02-28 NOTE — OB LABOR/OXYTOCIN SAFETY PROGRESS
Labor Progress Note - Greer Lu 30 y.o. female MRN: [id number]    Unit/Bed#: -01 Encounter: [id number]       Contraction Frequency (minutes): 1-2  Contraction Intensity: Mild/Moderate     Cervical Dilation: 5        Cervical Effacement: 100  Fetal Station: -1  Baseline Rate (FHR): 140 bpm  Fetal Heart Rate (FHT): 140 BPM  FHR Category: 1               Vital Signs:   Vitals:    02/28/25 0335   BP:    Pulse: 90   Resp:    Temp:    SpO2: 98%       Notes/comments:   Pt doing well. Not picking up contractions but reports they are every 1-2 minutes. Not on meds. Desires NCB, coping well with pain. Ok for intermittent monitoring.       Fernando Muñiz DO 2/28/2025 3:42 AM

## 2025-02-28 NOTE — H&P
History & Physical - OB/GYN   Greer Lu 30 y.o. female MRN: [id number]  Unit/Bed#: -01 Encounter: [id number]    Assessment:  30 y.o.  at 39w0d admitted for active labor    Plan:  1. Admit to L&D  2. Place IV, initiate IV fluids  3. Labs: CBC, RPR, type and screen  4. Labor management: expectant management  5. Analgesia/Epidural PRN        Prenatal care, subsequent pregnancy, third trimester  Desires natural labor. Declined AROM    _____________________    Chief complaint: I am in labor    She is a patient of [facility] [place].    HPI: Greer Lu is 30 y.o.  at 39w0d presenting for for labor. Reports contractions started around 5pm but became stronger around 11pm. States they are every 4-5 mins and moderate intensity.     Contractions:  yes  Fetal movement:  yes  Vaginal bleeding:   no  Leaking of fluid:  no      Pregnancy Complications:  Pregnancy Problems (from 24 to present)       Problem Noted Diagnosed Resolved    Abnormal glucose tolerance test (GTT) during pregnancy, antepartum 1/3/2025 by Fernando TOSCANO DO  No    Overview Addendum 2025  2:53 PM by Leobardo Castillo MD   Patient declined 3 hour GTT; 1 week of fasting and 1 hour post prandial BG's reviewed; does not meet criteria for GDM          Prenatal care, subsequent pregnancy, third trimester 10/17/2024 by Solo Finch MD  No    BMI 30 2024 by Fernando TOSCANO DO  No    Overview Addendum 2025  3:56 PM by IMANI Brink   Prepregnancy 29.5  GDM screening negative         39 weeks gestation of pregnancy 2024 by IMANI Brink  No    Overview Signed 2025  9:51 AM by Sonya Jiménez MD   2024 declined Adacel         Decreased fetal movements in third trimester 1/3/2025 by Jessa Piper DO  2025 by Leobardo Castillo MD                PMH:  Past Medical History:   Diagnosis Date    GERD (gastroesophageal reflux disease)     Kidney stone 2018    passed stone     Varicella        PSH:  Past Surgical History:   Procedure Laterality Date    TONSILLECTOMY  2017    TOOTH EXTRACTION  2012       Social Hx:  Social History     Tobacco Use    Smoking status: Former     Types: Cigarettes     Passive exposure: Never    Smokeless tobacco: Never   Vaping Use    Vaping status: Never Used   Substance Use Topics    Alcohol use: Not Currently     Comment: social    Drug use: No        OB Hx:  OB History    Para Term  AB Living   2 1 1 0 0 1   SAB IAB Ectopic Multiple Live Births   0 0 0 0 1      # Outcome Date GA Lbr Gio/2nd Weight Sex Type Anes PTL Lv   2 Current            1 Term 22 37w0d  3005 g (6 lb 10 oz) F Vag-Spont None N JUAN JOSÉ       Meds:  No current facility-administered medications on file prior to encounter.     Current Outpatient Medications on File Prior to Encounter   Medication Sig Dispense Refill    famotidine (PEPCID) 10 mg tablet Take 10 mg by mouth daily at bedtime as needed for heartburn      Prenatal Vit-Fe Fumarate-FA (PRENATAL VITAMIN PO) Take by mouth         Allergies:  Allergies   Allergen Reactions    Amoxicillin Rash       Labs:  ABO Grouping   Date Value Ref Range Status   2024 A  Final      Rh Type   Date Value Ref Range Status   2024 RH(D) POSITIVE  Final     Comment:        For additional information, please refer to   http://[place].Aframe/faq/ZLU017   (This link is being provided for informational/  educational purposes only.)        Rh Type   Date Value Ref Range Status   2024 RH(D) POSITIVE  Final     Comment:        For additional information, please refer to   http://Ancestry/faq/VLK144   (This link is being provided for informational/  educational purposes only.)       HIV-1/HIV-2 AB   Date Value Ref Range Status   2024 Non-Reactive  Final     HIV AG/AB, 4th Gen   Date Value Ref Range Status   2024 NON-REACTIVE NON-REACTIVE Final     Comment:     HIV-1 antigen and  "HIV-1/HIV-2 antibodies were not  detected. There is no laboratory evidence of HIV  infection.     PLEASE NOTE: This information has been disclosed to  you from records whose confidentiality may be  protected by state law.  If your state requires such  protection, then the state law prohibits you from  making any further disclosure of the information  without the specific written consent of the person  to whom it pertains, or as otherwise permitted by law.  A general authorization for the release of medical or  other information is NOT sufficient for this purpose.      For additional information please refer to  http://Intellione.Blitsy/faq/KDJ209  (This link is being provided for informational/  educational purposes only.)        The performance of this assay has not been clinically  validated in patients less than 2 years old.          Hepatitis B Surface Ag   Date Value Ref Range Status   08/08/2024 neg  Final     HEP C AB   Date Value Ref Range Status   08/08/2024 NON-REACTIVE NON-REACTIVE Final     Comment:        HCV antibody was non-reactive. There is no laboratory   evidence of HCV infection.     In most cases, no further action is required. However,  if recent HCV exposure is suspected, a test for HCV RNA  (test code 64826) is suggested.     For additional information please refer to  http://Intellione.Blitsy/faq/CGL00e2  (This link is being provided for informational/  educational purposes only.)          RPR   Date Value Ref Range Status   01/02/2025 NON-REACTIVE NON-REACTIVE Final     No results found for: \"RUBELLAIGGQT\"  Glucose   Date Value Ref Range Status   01/02/2025 140 (H) <135 mg/dL Final     Comment:        One hour value of > or = 135 mg/dL indicates the   need for a diagnostic 75 g dose 2-hour or   100 g dose 3-hour oral glucose tolerance test;  patient fasting is required.        No results found for: \"SOCYCPJ1UN\"  Strep Grp B ABHINAV   Date Value Ref Range Status "   02/13/2025 NOT DETECTED NOT DETECTED Final     Comment:             Note per CDC guidelines optimal recovery is achieved by  swabbing both the lower vagina and rectum (through the  anal sphincter).         Physical Exam:  /65 (BP Location: Right arm)   Pulse 82   Temp 98.5 °F (36.9 °C) (Oral)   Resp 16   LMP 05/29/2024 (Exact Date)   SpO2 98%     Gen: alert, no acute distress  Cardiac: regular rate  Resp: unlabored breathing  Abdomen: gravid, non-tender, non-distended  Extremities: non-tender, Minimal edema    Estimated Fetal Weight: 7 lbs  Presentation: cephalic, confirmed via u/s    SVE:   4/80/-1    Pelvis assessed and felt to be adequate    FHT: Cat 1, reactive baseline 130 with accels and no decels     TOCO: q4-5 mins       Membranes: intact      Shil V Antonino, DO  2/28/2025 1:14 AM

## 2025-03-01 VITALS
DIASTOLIC BLOOD PRESSURE: 72 MMHG | OXYGEN SATURATION: 100 % | RESPIRATION RATE: 18 BRPM | HEART RATE: 69 BPM | SYSTOLIC BLOOD PRESSURE: 121 MMHG | HEIGHT: 64 IN | WEIGHT: 194 LBS | BODY MASS INDEX: 33.12 KG/M2 | TEMPERATURE: 98.2 F

## 2025-03-01 LAB
BASOPHILS # BLD AUTO: 0.03 THOUSANDS/ÂΜL (ref 0–0.1)
BASOPHILS NFR BLD AUTO: 0 % (ref 0–1)
EOSINOPHIL # BLD AUTO: 0.06 THOUSAND/ÂΜL (ref 0–0.61)
EOSINOPHIL NFR BLD AUTO: 1 % (ref 0–6)
ERYTHROCYTE [DISTWIDTH] IN BLOOD BY AUTOMATED COUNT: 13 % (ref 11.6–15.1)
HCT VFR BLD AUTO: 41.2 % (ref 34.8–46.1)
HGB BLD-MCNC: 13.5 G/DL (ref 11.5–15.4)
IMM GRANULOCYTES # BLD AUTO: 0.07 THOUSAND/UL (ref 0–0.2)
IMM GRANULOCYTES NFR BLD AUTO: 1 % (ref 0–2)
LYMPHOCYTES # BLD AUTO: 1.79 THOUSANDS/ÂΜL (ref 0.6–4.47)
LYMPHOCYTES NFR BLD AUTO: 21 % (ref 14–44)
MCH RBC QN AUTO: 29.7 PG (ref 26.8–34.3)
MCHC RBC AUTO-ENTMCNC: 32.8 G/DL (ref 31.4–37.4)
MCV RBC AUTO: 91 FL (ref 82–98)
MONOCYTES # BLD AUTO: 0.54 THOUSAND/ÂΜL (ref 0.17–1.22)
MONOCYTES NFR BLD AUTO: 6 % (ref 4–12)
NEUTROPHILS # BLD AUTO: 6.21 THOUSANDS/ÂΜL (ref 1.85–7.62)
NEUTS SEG NFR BLD AUTO: 71 % (ref 43–75)
NRBC BLD AUTO-RTO: 0 /100 WBCS
PLATELET # BLD AUTO: 134 THOUSANDS/UL (ref 149–390)
PMV BLD AUTO: 11.7 FL (ref 8.9–12.7)
RBC # BLD AUTO: 4.55 MILLION/UL (ref 3.81–5.12)
WBC # BLD AUTO: 8.7 THOUSAND/UL (ref 4.31–10.16)

## 2025-03-01 PROCEDURE — 99024 POSTOP FOLLOW-UP VISIT: CPT | Performed by: STUDENT IN AN ORGANIZED HEALTH CARE EDUCATION/TRAINING PROGRAM

## 2025-03-01 PROCEDURE — 85025 COMPLETE CBC W/AUTO DIFF WBC: CPT | Performed by: OBSTETRICS & GYNECOLOGY

## 2025-03-01 RX ORDER — ACETAMINOPHEN 325 MG/1
650 TABLET ORAL EVERY 6 HOURS PRN
Qty: 30 TABLET | Refills: 0 | Status: SHIPPED | OUTPATIENT
Start: 2025-03-01 | End: 2025-03-11

## 2025-03-01 RX ORDER — IBUPROFEN 600 MG/1
600 TABLET, FILM COATED ORAL EVERY 6 HOURS PRN
Qty: 30 TABLET | Refills: 0 | Status: SHIPPED | OUTPATIENT
Start: 2025-03-01 | End: 2025-03-11

## 2025-03-01 RX ADMIN — ACETAMINOPHEN 650 MG: 325 TABLET, FILM COATED ORAL at 09:24

## 2025-03-01 RX ADMIN — IBUPROFEN 600 MG: 600 TABLET, FILM COATED ORAL at 06:36

## 2025-03-01 NOTE — PLAN OF CARE

## 2025-03-01 NOTE — DISCHARGE SUMMARY
Discharge Summary - OB/GYN   Name: Greer Lu 30 y.o. female I MRN: [id number]  Unit/Bed#: -01 I Date of Admission: 2025   Date of Service: 3/1/2025 I Hospital Day: 1    ADMISSION  Patient of: [facility]  Admission Date: 2025   Admitting Attending: Dr. Fernando TOSCANO DO  Admitting Diagnoses:   Patient Active Problem List   Diagnosis    Nephrolithiasis    Non-seasonal allergic rhinitis    At high risk for breast cancer    BMI 30    39 weeks gestation of pregnancy    Pelvic floor weakness    Prenatal care, subsequent pregnancy, third trimester    Abnormal glucose tolerance test (GTT) during pregnancy, antepartum     (spontaneous vaginal delivery)     DELIVERY  Delivery Method: Vaginal, Spontaneous   Delivery Date and Time: 2025 4:43 AM  Delivery Attending: Fernando Muñiz    DISCHARGE  Discharge Date: 3/1/2025   Discharge Attending: Dr. Leobardo Castillo MD  Discharge Diagnosis:   Same, Delivered  Clinical course: Admission to Delivery  Greer Lu is a 30 y.o.  who was admitted at 39w0d for active labor.    Patient was in spontaneous labor and managed expectantly. Labor course uncomplicated. She progressed to complete and began pushing. She had uncomplicated . No lacerations requiring repair.     Delivery  Route of Delivery: Vaginal, Spontaneous  Anesthesia: None,   QBL: Non-Surgical QBL (mL): 25        Delivery: Vaginal, Spontaneous at 2025 4:43 AM  Laceration: Perineal: None     Baby's Weight: 3020 g (6 lb 10.5 oz); 106.53    Apgar scores: 8  and 9  at 1 and 5 minutes, respectively    Clinical Course: Post-Delivery:    The post delivery course was remarkable. Hb trend: 12.5> 13.5.     On the day of discharge, the patient was ambulating, voiding spontaneously, tolerating oral intake, and hemodynamically stable. She was able to reasonably perform all ADLs. She had appropriate bowel function. Pain was well-controlled. She was discharged home on  postpartum/postop day #1 without complications.     Patient was instructed to follow up with her OB as an outpatient and was given appropriate warnings to call her provider with problems or concerns.    Pertinent lab findings included:   Blood type A+.     Last three Hgb values:  Lab Results   Component Value Date    HGB 13.5 2025    HGB 12.5 2025    HGB 12.3 2025      Problem-specific follow-up plans included the following:  Assessment & Plan  Nephrolithiasis  No current complaints   BMI 30  SCD's while inpatient   Abnormal glucose tolerance test (GTT) during pregnancy, antepartum  Did not meet criteria for GDM   (spontaneous vaginal delivery)  Meeting all post partum milestones appropriately   Stable for discharge home     Discharge med list:  Contraception: To be discussed at outpatient visit      Medication List      START taking these medications     acetaminophen 325 mg tablet; Commonly known as: TYLENOL; Take 2 tablets   (650 mg total) by mouth every 6 (six) hours as needed for mild pain for up   to 10 days   ibuprofen 600 mg tablet; Commonly known as: MOTRIN; Take 1 tablet (600   mg total) by mouth every 6 (six) hours as needed for mild pain for up to   10 days     CONTINUE taking these medications     famotidine 10 mg tablet; Commonly known as: PEPCID   PRENATAL VITAMIN PO       Condition at discharge:   good     Disposition:   Home    Planned Readmission:   No    Discharge Statement:  I have spent a total time of 30 minutes in caring for this patient on the day of the visit/encounter. >30 minutes of time was spent on: Diagnostic results, Instructions for management, Patient and family education, Counseling / Coordination of care, Documenting in the medical record, and Reviewing / ordering tests, medicine, procedures  .    Leobardo Castillo MD

## 2025-03-01 NOTE — PLAN OF CARE
Problem: POSTPARTUM  Goal: Experiences normal postpartum course  Description: INTERVENTIONS:  - Monitor maternal vital signs  - Assess uterine involution and lochia  Outcome: Adequate for Discharge  Goal: Appropriate maternal -  bonding  Description: INTERVENTIONS:  - Identify family support  - Assess for appropriate maternal/infant bonding   -Encourage maternal/infant bonding opportunities  - Referral to  or  as needed  Outcome: Adequate for Discharge  Goal: Establishment of infant feeding pattern  Description: INTERVENTIONS:  - Assess breast/bottle feeding  - Refer to lactation as needed  Outcome: Adequate for Discharge  Goal: Incision(s), wounds(s) or drain site(s) healing without S/S of infection  Description: INTERVENTIONS  - Assess and document dressing, incision, wound bed, drain sites and surrounding tissue  - Provide patient and family education    Outcome: Adequate for Discharge     Problem: PAIN - ADULT  Goal: Verbalizes/displays adequate comfort level or baseline comfort level  Description: Interventions:  - Encourage patient to monitor pain and request assistance  - Assess pain using appropriate pain scale  - Administer analgesics based on type and severity of pain and evaluate response  - Implement non-pharmacological measures as appropriate and evaluate response  - Consider cultural and social influences on pain and pain management  - Notify physician/advanced practitioner if interventions unsuccessful or patient reports new pain  Outcome: Adequate for Discharge     Problem: INFECTION - ADULT  Goal: Absence or prevention of progression during hospitalization  Description: INTERVENTIONS:  - Assess and monitor for signs and symptoms of infection  - Monitor lab/diagnostic results  - Monitor all insertion sites, i.e. indwelling lines, tubes, and drains  - Monitor endotracheal if appropriate and nasal secretions for changes in amount and color  - [place] appropriate  cooling/warming therapies per order  - Administer medications as ordered  - Instruct and encourage patient and family to use good hand hygiene technique  - Identify and instruct in appropriate isolation precautions for identified infection/condition  Outcome: Adequate for Discharge  Goal: Absence of fever/infection during neutropenic period  Description: INTERVENTIONS:  - Monitor WBC    Outcome: Adequate for Discharge     Problem: SAFETY ADULT  Goal: Patient will remain free of falls  Description: INTERVENTIONS:  - Educate patient/family on patient safety including physical limitations  - Instruct patient to call for assistance with activity   - Consult OT/PT to assist with strengthening/mobility   - Keep Call bell within reach  - Keep bed low and locked with side rails adjusted as appropriate  - Keep care items and personal belongings within reach  - Initiate and maintain comfort rounds    Outcome: Adequate for Discharge  Goal: Maintain or return to baseline ADL function  Description: INTERVENTIONS:  -  Assess patient's ability to carry out ADLs; assess patient's baseline for ADL function and identify physical deficits which impact ability to perform ADLs (bathing, care of mouth/teeth, toileting, grooming, dressing, etc.)  - Assess/evaluate cause of self-care deficits   - Assess range of motion  - Assess patient's mobility; develop plan if impaired  - Assess patient's need for assistive devices and provide as appropriate  - Encourage maximum independence but intervene and supervise when necessary  - Involve family in performance of ADLs  - Assess for home care needs following discharge   - Consider OT consult to assist with ADL evaluation and planning for discharge  - Provide patient education as appropriate  Outcome: Adequate for Discharge  Goal: Maintains/Returns to pre admission functional level  Description: INTERVENTIONS:  - Perform AM-PAC 6 Click Basic Mobility/ Daily Activity assessment daily.  - Set and  communicate daily mobility goal to care team and patient/family/caregiver.     Outcome: Adequate for Discharge     Problem: Knowledge Deficit  Goal: Patient/family/caregiver demonstrates understanding of disease process, treatment plan, medications, and discharge instructions  Description: Complete learning assessment and assess knowledge base.  Interventions:  - Provide teaching at level of understanding  - Provide teaching via preferred learning methods  Outcome: Adequate for Discharge     Problem: DISCHARGE PLANNING  Goal: Discharge to home or other facility with appropriate resources  Description: INTERVENTIONS:  - Identify barriers to discharge w/patient and caregiver  - Arrange for needed discharge resources and transportation as appropriate  - Identify discharge learning needs (meds, wound care, etc.)  - Arrange for interpretive services to assist at discharge as needed  - Refer to Case Management Department for coordinating discharge planning if the patient needs post-hospital services based on physician/advanced practitioner order or complex needs related to functional status, cognitive ability, or social support system  Outcome: Adequate for Discharge

## 2025-03-03 NOTE — UTILIZATION REVIEW
"    MOTHER AND BABY DISCHARGED     NOTIFICATION OF INPATIENT ADMISSION   MATERNITY/DELIVERY AUTHORIZATION REQUEST   SERVICING FACILITY:   [facility]  Parent Child Health - L&D, [place], NICU  [address] [place] [address]  Tax ID: [id number]  NPI: [id number] ATTENDING PROVIDER:  Attending Name and NPI#: Fernando TOSCANO Do [[id number]]  Address: [address] [place] [address]  Phone: [phone]     ADMISSION INFORMATION:  Place of Service: Inpatient [facility]  Place of Service Code: 21  Inpatient Admission Date/Time: 25  1:20 AM  Discharge Date/Time: 3/1/2025 12:00 PM  Admitting Diagnosis Code/Description:  Abdominal pain affecting pregnancy [O26.899, R10.9]     Mother: Greer Lu 1995 Estimated Date of Delivery: 3/7/25  Delivering clinician: Fernando Muñiz   OB History          2    Para   2    Term   2            AB        Living   2         SAB        IAB        Ectopic        Multiple   0    Live Births   2               [place]by Name & MRN:   Information for the patient's :  Aly, Baby Girl (Greer) [[id number]]    Delivery Information:  Sex: female  Delivered 2025 4:43 AM by Vaginal, Spontaneous; Gestational Age: 39w0d    [place] Measurements:  Weight: 6 lb 10.5 oz (3020 g);  Height: 20\"    APGAR 1 minute 5 minutes 10 minutes   Totals: 8 9       UTILIZATION REVIEW CONTACT:  Ioana Silvestre, Utilization   Network Utilization Review Department  Phone: [phone]  Fax [phone]  Email: [email or web address]  Contact for approvals/pending authorizations, clinical reviews, and discharge.     PHYSICIAN ADVISORY SERVICES:  Medical Necessity Denial & Qtkm-nr-Zkaf Review  Phone: [phone]  Fax: [phone]  Email: [email or web address]     DISCHARGE SUPPORT TEAM:  For Patients Discharge Needs & Updates  Phone: [phone] opt. 2 Fax: [phone]  Email: " [email or web address]     NOTIFICATION OF ADMISSION DISCHARGE   This is a Notification of Discharge from [facility]. Please be advised that this patient has been discharge from our facility. Below you will find the admission and discharge date and time including the patient’s disposition.   UTILIZATION REVIEW CONTACT:  Christi Silvestre  Utilization   Network Utilization Review Department  Phone: [phone] x carefully listen to the prompts. All voicemails are confidential.  Email: [email or web address]     ADMISSION INFORMATION  PRESENTATION DATE: 2/28/2025 12:39 AM  OBERVATION ADMISSION DATE: N/A  INPATIENT ADMISSION DATE: 2/28/25  1:20 AM   DISCHARGE DATE: 3/1/2025 12:00 PM   DISPOSITION:Home/Self Care    Network Utilization Review Department  ATTENTION: Please call with any questions or concerns to [phone] and carefully listen to the prompts so that you are directed to the right person. All voicemails are confidential.   For Discharge needs, contact Care Management DC Support Team at [phone] opt. 2  Send all requests for admission clinical reviews, approved or denied determinations and any other requests to dedicated fax number below belonging to the campus where the patient is receiving treatment. List of dedicated fax numbers for the Facilities:  FACILITY NAME UR FAX NUMBER   ADMISSION DENIALS (Administrative/Medical Necessity) [phone]   DISCHARGE SUPPORT TEAM ([facility]) [phone]   PARENT CHILD HEALTH (Maternity/NICU/Pediatrics) [phone]   [facility] [phone]   [facility] [phone]   [facility] [phone]   [facility] [phone]   [facility] [phone]   [facility] [phone]   [facility] [phone]   [facility]  [facility] [phone]   [facility] [phone]   [facility] [phone]   [facility] [phone]   [facility] [phone]

## 2025-03-04 NOTE — UTILIZATION REVIEW
I DID TRY TO CALL FIRST  WAS ON HOLD FOR 2 HOURS, JUST GET TALKING ABOUT WEBSITE.  NUMBER I CALLED [phone]

## 2025-03-06 ENCOUNTER — TELEPHONE (OUTPATIENT)
Dept: OBGYN CLINIC | Facility: CLINIC | Age: 30
End: 2025-03-06

## 2025-03-06 NOTE — TELEPHONE ENCOUNTER
POSTPARTUM PHONE CALL ASSESSMENT    Date of Delivery: 25  Delivering Provider: Dr. Muñiz  Mode:   Delivery Notes/Complications: Labor course uncomplicated. The post delivery course was remarkable. Hb trend: 12.5> 13.5.      Do you still have bleeding/pain? If so, how much/how severe? Bleeding decreased. Denies clots. And pain is tolerable.      Regular BMs/Urination? Pt states normal BM and no difficulty urination.     Breastfeeding/Formula/Both? Breast feeding    How are you doing emotionally? Overall she is feeling great and have help at home.    If struggling, obtain a EPDS Score: N/A    Do you have any other questions or concerns for us or your provider? She states she had some pins and needle sensation throughout her body and wondering if it is normal. Denies chest pain, weakness on either side, headache, visual disturbance, or swelling. She states she barely was able to get any rest for the past few days until last night she was able to get 3 hours stretches and get some rest. Advised her to make sure to increase her fluid intake and increase her calories intake during breast feeding and if she developed chest pain, difficulty breathing, visual disturbance, weakness on either side to proceed to the ER. She voiced appreciation.       Have you scheduled the pediatrician appointment with pediatrician? PT is currently at the pediatrician office.     Do you have a postpartum visit scheduled? Yes   Date scheduled: 3/20/25 Provider: Dr. Muñiz

## 2025-03-09 LAB — PLACENTA IN STORAGE: NORMAL

## 2025-03-20 ENCOUNTER — POSTPARTUM VISIT (OUTPATIENT)
Dept: OBGYN CLINIC | Facility: CLINIC | Age: 30
End: 2025-03-20

## 2025-03-20 VITALS
DIASTOLIC BLOOD PRESSURE: 72 MMHG | BODY MASS INDEX: 29.37 KG/M2 | SYSTOLIC BLOOD PRESSURE: 120 MMHG | WEIGHT: 172 LBS | HEIGHT: 64 IN

## 2025-03-20 PROCEDURE — 99024 POSTOP FOLLOW-UP VISIT: CPT | Performed by: OBSTETRICS & GYNECOLOGY

## 2025-03-20 NOTE — PROGRESS NOTES
"[facility] [place]  [address] [place] [id number]    Assessment/Plan:  Greer is a 30 y.o. year old  who presents for postpartum visit.    Routine Postpartum Care  Normal postpartum exam  Contraception: condoms  Depression Screen: 0  Feeding: breastfeeding  Cervical cancer screening Up to Date, next due   Follow up in: 3 months for annual exam or as needed.  Assessment & Plan  Encounter for postpartum visit         Subjective:     CC: Postpartum visit    Greer Lu is a 30 y.o. y.o. female  who presents for a postpartum visit.     She is 3 weeks postpartum following a  on 25 at 39 weeks. Postpartum course has been uncomplicated. She is doing well. She reports no complaints.      Bleeding staining only. Bowel function is normal. Bladder function is normal. Patient is not sexually active.     Postpartum Depression: Low Risk  (3/20/2025)    [place]  Depression Scale     Last EPDS Total Score: 0     Last EPDS Self Harm Result: Never       The following portions of the patient's history were reviewed and updated as appropriate: allergies, current medications, past family history, past medical history, obstetric history, gynecologic history, past social history, past surgical history and problem list.    Objective:  /72 (BP Location: Left arm, Patient Position: Sitting, Cuff Size: Standard)   Ht 5' 4\" (1.626 m)   Wt 78 kg (172 lb)   LMP 2024 (Exact Date)   Breastfeeding Yes   BMI 29.52 kg/m²   Pregravid Weight/BMI: 78 kg (172 lb) (BMI 29.51)  Current Weight: 78 kg (172 lb)   Total Weight Gain: 9.979 kg (22 lb)   Pre- Vitals      Flowsheet Row Most Recent Value   Prenatal Assessment    Prenatal Vitals    Blood Pressure 120/72   Weight - Scale 78 kg (172 lb)   Urine Albumin/Glucose    Dilation/Effacement/Station    Vaginal Drainage    Edema                General Appearance: alert and oriented, in no acute distress.   Abdomen: Soft, " non-tender, non-distended, no masses, no rebound or guarding.  Pelvic:       External genitalia: Normal appearance, no abnormal pigmentation, no lesions or masses. Normal Bartholin's and [place]'s. Obstetric laceration well-healed.       Urinary system: Urethral meatus normal, bladder non-tender.      Vaginal: normal mucosa without prolapse or lesions. Normal-appearing physiologic discharge.  Extremities: Normal range of motion.   Skin: normal, no rash or abnormalities  Neurologic: alert, oriented x3  Psychiatric: Appropriate affect, mood stable, cooperative with exam.        Fernando Muñiz DO  3/20/2025 12:32 PM

## 2025-04-28 ENCOUNTER — NURSE TRIAGE (OUTPATIENT)
Age: 30
End: 2025-04-28

## 2025-04-28 NOTE — TELEPHONE ENCOUNTER
"FOLLOW UP: Continue to monitor     REASON FOR CONVERSATION: Vaginal Bleeding    SYMPTOMS: spotting at 8 weeks post partum    OTHER: patient reporting some light spotting and small clots since 2 days ago,  on , currently breastfeeding. Denies unprotected intercourse/chance of pregnancy, not on any contraception. Discussed with patient with breastfeeding, sometimes the menses cycle can be abnormal and although some women do not get a menses with breastfeeding, some can have a menses/ spotting. Encouraged she monitor, and keep a bleeding log. Call back if any worsening/new symptoms.    DISPOSITION: Home Care  Reason for Disposition   Normal postpartum bleeding    Answer Assessment - Initial Assessment Questions  1. ONSET: \"When did the bleeding start?\" \"Describe your bleeding: is it getting worse, staying the same, improving, or stopping and starting?\"      2 days ago   2. BLEEDING SEVERITY: \"Describe the bleeding that you are having.\" \"How much bleeding is there?\"       Spotting started 2 days ago - light, small clots   3. ABDOMEN PAIN: \"Do you have any pain?\" \"How bad is the pain?\"  (e.g., Scale 0-10; none, mild, moderate, or severe)      Mild cramping   4. HORMONES: \"Are you taking any birth control medicines?\" (e.g., birth control pills, Depo-Provera)      Denies   5. BLOOD THINNERS: \"Do you take any blood thinners?\" (e.g., aspirin, enoxaparin / Lovenox, warfarin / Coumadin)      Denies   6. OTHER SYMPTOMS: \"Do you have any other symptoms?\" (e.g., fever, chills, dizziness)      Denies   7. DELIVERY DATE: \"When was your delivery date?\" \"Vaginal delivery or ?\"         8. BREASTFEEDING: \"Are you breastfeeding?\"      Yes    Protocols used: Postpartum - Vaginal Bleeding and Lochia-Adult-OH    "